# Patient Record
Sex: MALE | Race: WHITE | URBAN - METROPOLITAN AREA
[De-identification: names, ages, dates, MRNs, and addresses within clinical notes are randomized per-mention and may not be internally consistent; named-entity substitution may affect disease eponyms.]

---

## 2018-08-10 ENCOUNTER — OUTPATIENT (OUTPATIENT)
Dept: OUTPATIENT SERVICES | Facility: HOSPITAL | Age: 53
LOS: 1 days | Discharge: HOME | End: 2018-08-10

## 2021-08-02 ENCOUNTER — EMERGENCY (EMERGENCY)
Facility: HOSPITAL | Age: 56
LOS: 0 days | Discharge: HOME | End: 2021-08-03
Attending: EMERGENCY MEDICINE | Admitting: EMERGENCY MEDICINE
Payer: MEDICARE

## 2021-08-02 VITALS
SYSTOLIC BLOOD PRESSURE: 140 MMHG | HEART RATE: 104 BPM | DIASTOLIC BLOOD PRESSURE: 88 MMHG | OXYGEN SATURATION: 97 % | RESPIRATION RATE: 18 BRPM | TEMPERATURE: 99 F
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
## 2021-08-02 DIAGNOSIS — F31.9 BIPOLAR DISORDER, UNSPECIFIED: ICD-10-CM

## 2021-08-02 DIAGNOSIS — F10.929 ALCOHOL USE, UNSPECIFIED WITH INTOXICATION, UNSPECIFIED: ICD-10-CM

## 2021-08-02 DIAGNOSIS — R45.6 VIOLENT BEHAVIOR: ICD-10-CM

## 2021-08-02 DIAGNOSIS — F10.129 ALCOHOL ABUSE WITH INTOXICATION, UNSPECIFIED: ICD-10-CM

## 2021-08-02 DIAGNOSIS — Z79.899 OTHER LONG TERM (CURRENT) DRUG THERAPY: ICD-10-CM

## 2021-08-02 DIAGNOSIS — R00.0 TACHYCARDIA, UNSPECIFIED: ICD-10-CM

## 2021-08-02 LAB
ALBUMIN SERPL ELPH-MCNC: 5 G/DL — SIGNIFICANT CHANGE UP (ref 3.5–5.2)
ALP SERPL-CCNC: 103 U/L — SIGNIFICANT CHANGE UP (ref 30–115)
ALT FLD-CCNC: 110 U/L — HIGH (ref 0–41)
ANION GAP SERPL CALC-SCNC: 26 MMOL/L — HIGH (ref 7–14)
APAP SERPL-MCNC: <5 UG/ML — LOW (ref 10–30)
APPEARANCE UR: CLEAR — SIGNIFICANT CHANGE UP
AST SERPL-CCNC: 79 U/L — HIGH (ref 0–41)
BASOPHILS # BLD AUTO: 0.1 K/UL — SIGNIFICANT CHANGE UP (ref 0–0.2)
BASOPHILS NFR BLD AUTO: 1.4 % — HIGH (ref 0–1)
BILIRUB SERPL-MCNC: 0.4 MG/DL — SIGNIFICANT CHANGE UP (ref 0.2–1.2)
BILIRUB UR-MCNC: NEGATIVE — SIGNIFICANT CHANGE UP
BUN SERPL-MCNC: 15 MG/DL — SIGNIFICANT CHANGE UP (ref 10–20)
CALCIUM SERPL-MCNC: 9.5 MG/DL — SIGNIFICANT CHANGE UP (ref 8.5–10.1)
CHLORIDE SERPL-SCNC: 99 MMOL/L — SIGNIFICANT CHANGE UP (ref 98–110)
CO2 SERPL-SCNC: 16 MMOL/L — LOW (ref 17–32)
COLOR SPEC: YELLOW — SIGNIFICANT CHANGE UP
COMMENT - URINE: SIGNIFICANT CHANGE UP
CREAT SERPL-MCNC: 1.1 MG/DL — SIGNIFICANT CHANGE UP (ref 0.7–1.5)
DIFF PNL FLD: ABNORMAL
EOSINOPHIL # BLD AUTO: 0.14 K/UL — SIGNIFICANT CHANGE UP (ref 0–0.7)
EOSINOPHIL NFR BLD AUTO: 1.9 % — SIGNIFICANT CHANGE UP (ref 0–8)
EPI CELLS # UR: ABNORMAL /HPF
ETHANOL SERPL-MCNC: 298 MG/DL — HIGH
GLUCOSE SERPL-MCNC: 109 MG/DL — HIGH (ref 70–99)
GLUCOSE UR QL: NEGATIVE MG/DL — SIGNIFICANT CHANGE UP
HCT VFR BLD CALC: 51.6 % — SIGNIFICANT CHANGE UP (ref 42–52)
HGB BLD-MCNC: 17.7 G/DL — SIGNIFICANT CHANGE UP (ref 14–18)
IMM GRANULOCYTES NFR BLD AUTO: 0.4 % — HIGH (ref 0.1–0.3)
KETONES UR-MCNC: 15
LEUKOCYTE ESTERASE UR-ACNC: NEGATIVE — SIGNIFICANT CHANGE UP
LYMPHOCYTES # BLD AUTO: 2.75 K/UL — SIGNIFICANT CHANGE UP (ref 1.2–3.4)
LYMPHOCYTES # BLD AUTO: 37.7 % — SIGNIFICANT CHANGE UP (ref 20.5–51.1)
MCHC RBC-ENTMCNC: 31.4 PG — HIGH (ref 27–31)
MCHC RBC-ENTMCNC: 34.3 G/DL — SIGNIFICANT CHANGE UP (ref 32–37)
MCV RBC AUTO: 91.5 FL — SIGNIFICANT CHANGE UP (ref 80–94)
MONOCYTES # BLD AUTO: 0.76 K/UL — HIGH (ref 0.1–0.6)
MONOCYTES NFR BLD AUTO: 10.4 % — HIGH (ref 1.7–9.3)
NEUTROPHILS # BLD AUTO: 3.52 K/UL — SIGNIFICANT CHANGE UP (ref 1.4–6.5)
NEUTROPHILS NFR BLD AUTO: 48.2 % — SIGNIFICANT CHANGE UP (ref 42.2–75.2)
NITRITE UR-MCNC: NEGATIVE — SIGNIFICANT CHANGE UP
NRBC # BLD: 0 /100 WBCS — SIGNIFICANT CHANGE UP (ref 0–0)
PH UR: 5.5 — SIGNIFICANT CHANGE UP (ref 5–8)
PLATELET # BLD AUTO: 217 K/UL — SIGNIFICANT CHANGE UP (ref 130–400)
POTASSIUM SERPL-MCNC: 4.2 MMOL/L — SIGNIFICANT CHANGE UP (ref 3.5–5)
POTASSIUM SERPL-SCNC: 4.2 MMOL/L — SIGNIFICANT CHANGE UP (ref 3.5–5)
PROT SERPL-MCNC: 7.5 G/DL — SIGNIFICANT CHANGE UP (ref 6–8)
PROT UR-MCNC: 100 MG/DL
RBC # BLD: 5.64 M/UL — SIGNIFICANT CHANGE UP (ref 4.7–6.1)
RBC # FLD: 11.7 % — SIGNIFICANT CHANGE UP (ref 11.5–14.5)
RBC CASTS # UR COMP ASSIST: ABNORMAL /HPF
SALICYLATES SERPL-MCNC: <0.3 MG/DL — LOW (ref 4–30)
SODIUM SERPL-SCNC: 141 MMOL/L — SIGNIFICANT CHANGE UP (ref 135–146)
SP GR SPEC: >=1.03 (ref 1.01–1.03)
UROBILINOGEN FLD QL: 0.2 MG/DL — SIGNIFICANT CHANGE UP (ref 0.2–0.2)
WBC # BLD: 7.3 K/UL — SIGNIFICANT CHANGE UP (ref 4.8–10.8)
WBC # FLD AUTO: 7.3 K/UL — SIGNIFICANT CHANGE UP (ref 4.8–10.8)
WBC UR QL: SIGNIFICANT CHANGE UP /HPF

## 2021-08-02 PROCEDURE — 99284 EMERGENCY DEPT VISIT MOD MDM: CPT

## 2021-08-02 PROCEDURE — 93010 ELECTROCARDIOGRAM REPORT: CPT

## 2021-08-02 PROCEDURE — 90792 PSYCH DIAG EVAL W/MED SRVCS: CPT | Mod: 95

## 2021-08-02 RX ORDER — MIDAZOLAM HYDROCHLORIDE 1 MG/ML
5 INJECTION, SOLUTION INTRAMUSCULAR; INTRAVENOUS ONCE
Refills: 0 | Status: DISCONTINUED | OUTPATIENT
Start: 2021-08-02 | End: 2021-08-02

## 2021-08-02 RX ORDER — DIPHENHYDRAMINE HCL 50 MG
50 CAPSULE ORAL EVERY 6 HOURS
Refills: 0 | Status: DISCONTINUED | OUTPATIENT
Start: 2021-08-02 | End: 2021-08-03

## 2021-08-02 RX ADMIN — Medication 50 MILLIGRAM(S): at 13:06

## 2021-08-02 RX ADMIN — Medication 1 MILLIGRAM(S): at 14:00

## 2021-08-02 RX ADMIN — Medication 50 MILLIGRAM(S): at 20:50

## 2021-08-02 RX ADMIN — Medication 2 MILLIGRAM(S): at 13:11

## 2021-08-02 RX ADMIN — Medication 2 MILLIGRAM(S): at 13:06
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
## 2021-08-02 NOTE — ED PROVIDER NOTE - NSFOLLOWUPINSTRUCTIONS_ED_ALL_ED_FT
ALCOHOL DEPENDENCE - General Information     Alcohol Dependence    WHAT YOU NEED TO KNOW:    What is alcohol dependence? Alcohol dependence is the need to drink alcohol often to function in your daily life. You often drink large amounts of alcohol. Alcohol dependence is also known as alcoholism or alcohol use disorder. Alcoholism is a disease that can affect almost every part of your body.    What behaviors are common with alcohol dependence?     You keep drinking alcohol even if you know it increases your risk for health problems. Health problems include liver problems, stomach ulcers, high blood pressure, and stroke.      You develop a tolerance for alcohol. Tolerance means the amount of alcohol you usually drink no longer causes the effects you desire. You may need to drink even more alcohol to get its previous effects.      You put extra effort and time into drinking alcohol. You may often go to events or activities that will include drinking. You may also spend much of your time drinking alcohol or being with people who also drink.      You have withdrawal (physical or mental) symptoms after not drinking for a short period. The same amount of alcohol may be needed to relieve or prevent withdrawal symptoms. You may also have to drink to stop tremors (shakes) or to cure a hangover.      You crave alcohol. You may have a desire to drink more frequently and to drink larger amounts of alcohol.      You have problems decreasing or controlling alcohol use. You are not able to control your drinking habits. You keep going back to drinking even after you quit.      You spend less time doing more important things. You have trouble with social or daily activities at school, work, or home.    What increases my risk for alcohol dependence?     Family history      Depression or anxiety      Other substance abuse      Childhood trauma      Posttraumatic stress disorder      Other disorders, such as antisocial personality disorder and bipolar disorder    How is alcohol dependence treated? Your healthcare provider may admit you to the hospital to make sure you withdraw safely. Then you may need any of the following:    Medicines to decrease your craving for alcohol      Support groups such as Alcoholics Anonymous       Psychiatrist or psychologist for therapy       Admission to an inpatient facility for treatment for severe dependence    Where can I get more information about alcohol dependence?       [facility]  [address] [place], [place] [id number]  Phone: 1-126.714.1912  Phone: 1-695.552.7272  Web Address: http://www.ncadd.org            Alcoholics Anonymous  Web Address: http://www.aa.org      CARE AGREEMENT:    You have the right to help plan your care. Learn about your health condition and how it may be treated. Discuss treatment options with your healthcare providers to decide what care you want to receive. You always have the right to refuse treatment        Log Out.    [employer]® CareNotes®     :  [facility]             DEPRESSION - General Information     Depression    WHAT YOU NEED TO KNOW:    What is depression? Depression is a medical condition that causes feelings of sadness or hopelessness that do not go away. Depression may cause you to lose interest in things you used to enjoy. These feelings may interfere with your daily life.    What causes or increases my risk for depression? Depression may be caused by changes in brain chemicals that affect your mood. Your risk for depression may be higher if you have any of the following:    Stressful events such as the death of a loved one, unemployment, or divorce      A family history of depression      A chronic medical condition, such as diabetes, heart disease, or cancer      Drug or alcohol abuse    What are the signs and symptoms of depression?     Appetite changes, or weight gain or loss      Trouble going to sleep or staying asleep, or sleeping too much      Fatigue or lack of energy      Feeling restless, irritable, or withdrawn      Feeling worthless, hopeless, discouraged, or guilty      Trouble concentrating, remembering things, doing daily tasks, or making decisions      Thoughts about hurting or killing yourself    How is depression diagnosed? Your healthcare provider will ask about your symptoms and how long you have had them. He or she will ask if you have any family members with depression. Tell your healthcare provider about any stressful events in your life. He or she may ask about any other health conditions or medicines you take.     How is depression treated?     Therapy is often used together with medicine. Therapy is a way for you to talk about your feelings and anything that may be causing depression. Therapy can be done alone or in a group. It may also be done with family members or a significant other.      Antidepressant medicine may be given to relieve depression. You may need to take this medicine for several weeks before you begin to feel better. Tell your healthcare provider about any side effects or problems you have with your medicine. The type or amount of medicine may need to be changed.    How can I manage depression?     Get regular physical activity. Try to be active for 30 minutes, 3 to 5 days a week. Physical activity can help relieve depression. Work with your healthcare provider to develop a plan that you enjoy. It may help to ask someone to be active with you.      Create a regular sleep schedule. A routine can help you relax before bed. Listen to music, read, or do yoga. Try to go to bed and wake up at the same time every day. Sleep is important for emotional health.      Eat a variety of healthy foods. Healthy foods include fruits, vegetables, whole-grain breads, low-fat dairy products, lean meats, fish, and cooked beans. A healthy meal plan is low in fat, salt, and added sugar.      Do not drink alcohol or use drugs. Alcohol and drugs can make depression worse. Talk to your therapist or doctor if you need help quitting.    The following resources are available at any time to help you, if needed:     National Suicide Prevention Lifeline: 1-621.876.9973 (8-922-294-TALK)      Suicide Hotline: 1-908.141.7833 (7-492-LJKCPKA)      For a list of international numbers: https://save.org/find-help/international-resources/    Call your local emergency number (911 in the ) if:     You think about harming yourself or someone else.      You have done something on purpose to hurt yourself.    When should I call my therapist or doctor?     Your symptoms do not improve.      You cannot make it to your next appointment.       You have new symptoms.    You have questions or concerns about your condition or care    CARE AGREEMENT:  You have the right to help plan your care. Learn about your health condition and how it may be treated. Discuss treatment options with your healthcare providers to decide what care you want to receive. You always have the right to refuse treatmen

## 2021-08-02 NOTE — ED PROVIDER NOTE - PHYSICAL EXAMINATION
--EXAM--  VITAL SIGNS: I have reviewed vs documented at present.  CONSTITUTIONAL: intoxicated       CARD: S1, S2, Regular rate and rhythm.   RESP: No wheezes, rales or rhonchi.    PSYCH: yelling and agitated.
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
## 2021-08-02 NOTE — ED ADULT NURSE NOTE - PAIN RATING/NUMBER SCALE (0-10): REST
0 Render Post-Care Instructions In Note?: yes Render In Bullet Format When Appropriate: No Consent: The patient's consent was obtained including but not limited to risks of crusting, scabbing, blistering, scarring, darker or lighter pigmentary change, recurrence, incomplete removal and infection. Detail Level: Zone Total Number Of Aks Treated: 22 Post-Care Instructions: I reviewed with the patient in detail post-care instructions. Patient is to wear sunprotection, and avoid picking at any of the treated lesions. Pt may apply Vaseline to crusted or scabbing areas. Number Of Freeze-Thaw Cycles: 1 freeze-thaw cycle Duration Of Freeze Thaw-Cycle (Seconds): 20
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
## 2021-08-02 NOTE — ED PROVIDER NOTE - CLINICAL SUMMARY MEDICAL DECISION MAKING FREE TEXT BOX
56yM presents  intoxicated and with SI  -  labs reviewed  pt observed  until ETOH <100  pysch consulted 56yM presents  intoxicated and with SI  -  labs reviewed  pt observed  until ETOH <100  pysch consulted   cleared by psych  Pt not driving.  safety information   given to patient on discharge

## 2021-08-02 NOTE — ED PROVIDER NOTE - PATIENT PORTAL LINK FT
You can access the FollowMyHealth Patient Portal offered by [facility] by registering at the following website: [email or web address]. By joining [employer]’s FollowMyHealth portal, you will also be able to view your health information using other applications (apps) compatible with our system.

## 2021-08-02 NOTE — ED ADULT NURSE NOTE - CAS ELECT INFOMATION PROVIDED
pt awake alert and oriented.  waiting for belongings from security.  states he will walk home, lives 2 blocks away.  ambulates w/o assist./DC instructions

## 2021-08-03 VITALS
TEMPERATURE: 98 F | OXYGEN SATURATION: 98 % | RESPIRATION RATE: 17 BRPM | HEART RATE: 89 BPM | SYSTOLIC BLOOD PRESSURE: 142 MMHG | DIASTOLIC BLOOD PRESSURE: 89 MMHG

## 2021-08-03 DIAGNOSIS — F31.9 BIPOLAR DISORDER, UNSPECIFIED: ICD-10-CM
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
## 2021-08-03 NOTE — ED BEHAVIORAL HEALTH ASSESSMENT NOTE - RISK ASSESSMENT
Low Acute Suicide Risk COVID Exposure Screen- Patient  1.	*Have you had a COVID-19 test in the last 90 days?  (  ) Yes   (  ) No   ( x ) Unknown- Reason: _____  IF YES PROCEED TO QUESTION #2. IF NO OR UNKNOWN, PLEASE SKIP TO QUESTION #3.  2.	Date of test(s) and result(s): ________  3.	*Have you tested positive for COVID-19 antibodies? (  ) Yes   (  ) No   (x  ) Unknown- Reason: _____  IF YES PROCEED TO QUESTION #4. IF NO or UNKNOWN, PLEASE SKIP TO QUESTION #5.  4.	Date of positive antibody test: ________  5.	*Have you received 2 doses of the COVID-19 vaccine? ( x ) Yes   ( ) No   (  ) Unknown- Reason: _____   IF YES PROCEED TO QUESTION #6. IF NO or UNKNOWN, PLEASE SKIP TO QUESTION #7.  6.	Date of second dose: ________  7.	*In the past 10 days, have you been around anyone with a positive COVID-19 test?* (  ) Yes   (  ) No   ( x ) Unknown- Reason: ____  IF YES PROCEED TO QUESTION #8. IF NO or UNKNOWN, PLEASE SKIP TO QUESTION #13.  8.	Were you within 6 feet of them for at least 15 minutes? (  ) Yes   (  ) No   (  ) Unknown- Reason: _____  9.	Have you provided care for them? (  ) Yes   (  ) No   (  ) Unknown- Reason: ______  10.	Have you had direct physical contact with them (touched, hugged, or kissed them)? (  ) Yes   (  ) No    (  ) Unknown- Reason: _____  11.	Have you shared eating or drinking utensils with them? (  ) Yes   (  ) No    (  ) Unknown- Reason: ____  12.	Have they sneezed, coughed, or somehow gotten respiratory droplets on you? (  ) Yes   (  ) No    (  ) Unknown- Reason: ______  13.	*Have you been out of [place] within the past 10 days?* (  ) Yes   (  ) No   (  x) Unknown- Reason: _____  IF YES PLEASE ANSWER THE FOLLOWING QUESTIONS:  14.	Which state/country have you been to? ______  15.	Were you there over 24 hours? (  ) Yes   (  ) No    (  ) Unknown- Reason: ______  16.	Date of return to [facility]: ______ rf - alcohol abuse, mood disorder, recent stressors  pf - future oriented, help seeking, no prior SAs, no past hospitalizations, current outpatient care, identifies reasons for living, responsibility to others, healthy    COVID Exposure Screen- Patient  1.	*Have you had a COVID-19 test in the last 90 days?  (  ) Yes   (  ) No   ( x ) Unknown- Reason: _____  IF YES PROCEED TO QUESTION #2. IF NO OR UNKNOWN, PLEASE SKIP TO QUESTION #3.  2.	Date of test(s) and result(s): ________  3.	*Have you tested positive for COVID-19 antibodies? (  ) Yes   (  ) No   (x  ) Unknown- Reason: _____  IF YES PROCEED TO QUESTION #4. IF NO or UNKNOWN, PLEASE SKIP TO QUESTION #5.  4.	Date of positive antibody test: ________  5.	*Have you received 2 doses of the COVID-19 vaccine? ( x ) Yes   ( ) No   (  ) Unknown- Reason: _____   IF YES PROCEED TO QUESTION #6. IF NO or UNKNOWN, PLEASE SKIP TO QUESTION #7.  6.	Date of second dose: ________  7.	*In the past 10 days, have you been around anyone with a positive COVID-19 test?* (  ) Yes   (  ) No   ( x ) Unknown- Reason: ____  IF YES PROCEED TO QUESTION #8. IF NO or UNKNOWN, PLEASE SKIP TO QUESTION #13.  8.	Were you within 6 feet of them for at least 15 minutes? (  ) Yes   (  ) No   (  ) Unknown- Reason: _____  9.	Have you provided care for them? (  ) Yes   (  ) No   (  ) Unknown- Reason: ______  10.	Have you had direct physical contact with them (touched, hugged, or kissed them)? (  ) Yes   (  ) No    (  ) Unknown- Reason: _____  11.	Have you shared eating or drinking utensils with them? (  ) Yes   (  ) No    (  ) Unknown- Reason: ____  12.	Have they sneezed, coughed, or somehow gotten respiratory droplets on you? (  ) Yes   (  ) No    (  ) Unknown- Reason: ______  13.	*Have you been out of [place] within the past 10 days?* (  ) Yes   (  ) No   (  x) Unknown- Reason: _____  IF YES PLEASE ANSWER THE FOLLOWING QUESTIONS:  14.	Which state/country have you been to? ______  15.	Were you there over 24 hours? (  ) Yes   (  ) No    (  ) Unknown- Reason: ______  16.	Date of return to [facility]: ______

## 2021-08-03 NOTE — ED BEHAVIORAL HEALTH ASSESSMENT NOTE - OTHER PAST PSYCHIATRIC HISTORY (INCLUDE DETAILS REGARDING ONSET, COURSE OF ILLNESS, INPATIENT/OUTPATIENT TREATMENT)
reports dx of bipolar as a child, no prior admissions, sees therapist Sarah and psychiatrist Sylvia at [facility] on [facility]

## 2021-08-03 NOTE — ED BEHAVIORAL HEALTH ASSESSMENT NOTE - DETAILS
hpi safety plan completed and copy given to patient; patient advised to return to ED or call 911 if symptoms worsen or thoughts to harm self or others occur. father self

## 2021-08-03 NOTE — ED BEHAVIORAL HEALTH ASSESSMENT NOTE - SUMMARY
Patient is a 57yo male, , domiciled with girlfriend, has 2 adult and 2 young children (5, 3) in custody of mother, part time employed as heating/AC technician, with self reported pph of bipolar disorder, no prior psych admissions, current outpatient at [facility], no past reported SAs or NSSIB, no known hx of violence, active alcohol abuse, and no known pmh. He is BIBEMS from home for SI after contacting suicide hotline while intoxicated;  on arrival.     Patient was drinking more than usual tonight and stressed after some dental and medical issues over the weekend. He usually leans on his live in girlfriend and close by family for support but they were all unavailable tonight so he spoke to a suicide hotline. He has had recent stressors but otherwise denies significant changes in his mood. He has outpatient care in place, is future oriented and help seeking, and was able to safety plan appropriately. He is not interested in voluntary admission and does not meet criteria for involuntary hospitalization; cleared for discharge

## 2021-08-03 NOTE — ED BEHAVIORAL HEALTH ASSESSMENT NOTE - SAFETY PLAN ADDT'L DETAILS
Safety plan discussed with.../Education provided regarding environmental safety / lethal means restriction/Provision of National Suicide Prevention Lifeline 1-265-813-RKQU (6880)

## 2021-08-03 NOTE — ED BEHAVIORAL HEALTH ASSESSMENT NOTE - REFERRAL / APPOINTMENT DETAILS
Patient will follow up with outpatient psychiatrist and therapist by phone in the morning with upcoming appts

## 2021-08-03 NOTE — ED BEHAVIORAL HEALTH ASSESSMENT NOTE - HPI (INCLUDE ILLNESS QUALITY, SEVERITY, DURATION, TIMING, CONTEXT, MODIFYING FACTORS, ASSOCIATED SIGNS AND SYMPTOMS)
Patient is a Patient is a 57yo male, , domiciled with girlfriend, has 2 adult and 2 young children (5, 3) in custody of mother, part time employed as heating/AC technician, with self reported pph of bipolar disorder, no prior psych admissions, current outpatient at [facility], no past reported SAs or NSSIB, no known hx of violence, active alcohol abuse, and no known pmh. He is BIBEMS from home for SI after contacting suicide hotline while intoxicated; BAL     Patient reports he was upset because the dentist had pulled all his front teeth out on Saturday and so drank more than usual/ He admits to drinking 5 to 6 "shotties" or lil pints of hard liquor. He initially denies events leading up to presentation, but later confirms contacting suicide hotline after prompting. He denies any past or current passive or active SI/P/I or SAs, he reports sadness from not being able to see his children who live with their mother despite paying child support. This has been going on for  years. He also notes his parents passed recently. Patient denies any depressive symptoms and minimizes mood symptoms. He reports good sleep, appetite, energy, and interest. Denies s/sx of psychosis or yaneth including elevated mood, increased energy, increased goal directed activities, decreased need for sleep, talkativeness, racing thoughts, increased distractibility, grandiosity, impulsivity and risky behavior, AVH, paranoia, delusions. He reports drinking approx 5 days a week, about 1 drink on avg. He describes compliance to his outpatient providers and meds. Patient reports he lives on the same block as multiple family members including cousins, brother that he usually turns to for support but were unavailable today. He states he had called the suicide hotline just looking for someone to talk to as a friend and did not meant to concern anyone. He is insistent on discharge home and asserts he is able to keep himself safe.

## 2021-08-03 NOTE — ED BEHAVIORAL HEALTH ASSESSMENT NOTE - DESCRIPTION
see bh note    Vital Signs Last 24 Hrs  T(C): 36.7 (03 Aug 2021 00:23), Max: 37.4 (02 Aug 2021 12:33)  T(F): 98.1 (03 Aug 2021 00:23), Max: 99.3 (02 Aug 2021 12:33)  HR: 89 (03 Aug 2021 00:23) (89 - 107)  BP: 142/89 (03 Aug 2021 00:23) (139/91 - 142/89)  BP(mean): --  RR: 17 (03 Aug 2021 00:23) (17 - 18)  SpO2: 98% (03 Aug 2021 00:23) (97% - 98%) see hpi denies

## 2022-04-23 ENCOUNTER — EMERGENCY (EMERGENCY)
Facility: HOSPITAL | Age: 57
LOS: 0 days | Discharge: HOME | End: 2022-04-23
Attending: EMERGENCY MEDICINE | Admitting: EMERGENCY MEDICINE
Payer: MEDICARE

## 2022-04-23 VITALS
HEART RATE: 74 BPM | RESPIRATION RATE: 18 BRPM | SYSTOLIC BLOOD PRESSURE: 172 MMHG | DIASTOLIC BLOOD PRESSURE: 98 MMHG | TEMPERATURE: 98 F | OXYGEN SATURATION: 98 %

## 2022-04-23 DIAGNOSIS — M79.642 PAIN IN LEFT HAND: ICD-10-CM

## 2022-04-23 DIAGNOSIS — F17.200 NICOTINE DEPENDENCE, UNSPECIFIED, UNCOMPLICATED: ICD-10-CM

## 2022-04-23 DIAGNOSIS — M79.89 OTHER SPECIFIED SOFT TISSUE DISORDERS: ICD-10-CM

## 2022-04-23 PROCEDURE — 99283 EMERGENCY DEPT VISIT LOW MDM: CPT | Mod: 25

## 2022-04-23 PROCEDURE — 73130 X-RAY EXAM OF HAND: CPT | Mod: 26,LT

## 2022-04-23 PROCEDURE — 29125 APPL SHORT ARM SPLINT STATIC: CPT

## 2022-04-23 RX ORDER — IBUPROFEN 200 MG
600 TABLET ORAL ONCE
Refills: 0 | Status: COMPLETED | OUTPATIENT
Start: 2022-04-23 | End: 2022-04-23

## 2022-04-23 RX ADMIN — Medication 600 MILLIGRAM(S): at 11:23

## 2022-04-23 RX ADMIN — Medication 600 MILLIGRAM(S): at 10:53

## 2022-04-23 NOTE — ED PROVIDER NOTE - PHYSICAL EXAMINATION
VITAL SIGNS: I have reviewed nursing notes and confirm.  CONSTITUTIONAL: Well-developed; well-nourished; in no acute distress.  SKIN: Skin exam is warm and dry, no acute rash.  HEAD: Normocephalic; atraumatic.  EYES: PERRL, EOM intact; conjunctiva and sclera clear.  ENT: No nasal discharge; airway clear.   EXT: Normal ROM. +Minimal swelling to left hand. +Tenderness to first digit with painful ROM however full passive ROM. +Left snuffbox tenderness. No erythema.   LYMPH: No acute cervical adenopathy.  NEURO: Alert, oriented. Grossly unremarkable. No focal deficits.  PSYCH: Cooperative, appropriate.

## 2022-04-23 NOTE — ED PROVIDER NOTE - PATIENT PORTAL LINK FT
You can access the FollowMyHealth Patient Portal offered by [facility] by registering at the following website: [email or web address]. By joining [facility]’s FollowMyHealth portal, you will also be able to view your health information using other applications (apps) compatible with our system.

## 2022-04-23 NOTE — ED PROVIDER NOTE - OBJECTIVE STATEMENT
56 yo M presenting with left hand pain and swelling since this morning. Denies any trauma but does reports sleeping on his hand. No paresthesias, weakness, bruising, or bleeding. Pain is worse with movement on left thumb.

## 2022-04-23 NOTE — ED PROVIDER NOTE - CARE PROVIDER_API CALL
Jason Almonte)  Orthopaedic Surgery  [address] [place] [address]  Phone: [phone]  Fax: [phone]  Follow Up Time: 1-3 Days

## 2022-04-23 NOTE — ED PROVIDER NOTE - NS ED ROS FT
Review of Systems:  	•	CONSTITUTIONAL - no fever, no diaphoresis, no chills  	•	SKIN - no rash  	•	HEMATOLOGIC - no bleeding, no bruising  	•	EYES - no eye pain, no blurry vision  	•	ENT - no congestion  	•	RESPIRATORY - no shortness of breath, no cough  	•	CARDIAC - no chest pain, no palpitations  	•	MUSCULOSKELETAL - +hand pain and swelling, no redness  	•	NEUROLOGIC - no weakness, no headache, no paresthesias  	All other ROS are negative except as documented in HPI.

## 2022-04-23 NOTE — ED PROVIDER NOTE - NS ED ATTENDING STATEMENT MOD
This was a shared visit with the TICO. I reviewed and verified the documentation and independently performed the documented:

## 2022-04-23 NOTE — ED PROVIDER NOTE - CLINICAL SUMMARY MEDICAL DECISION MAKING FREE TEXT BOX
Patient presented with atraumatic left hand pain and swelling since this morning, although patient states that he slept on his hand last night which may have led to the pain.  Otherwise on arrival patient afebrile, hemodynamically stable, neurovascularly intact, no signs of infection on exam.  Obtain x-ray which was negative for acute fracture dislocation, and pain otherwise controlled with p.o. NSAID in ED.  Patient able to use hand without difficulty.  Given the above, will discharge home with outpatient follow up. Patient agreeable with plan. Agrees to return to ED for any new or worsening symptoms.

## 2023-04-14 ENCOUNTER — NON-APPOINTMENT (OUTPATIENT)
Age: 58
End: 2023-04-14

## 2023-04-15 ENCOUNTER — INPATIENT (INPATIENT)
Facility: HOSPITAL | Age: 58
LOS: 1 days | Discharge: HOME | DRG: 144 | End: 2023-04-17
Attending: INTERNAL MEDICINE | Admitting: INTERNAL MEDICINE
Payer: MEDICARE

## 2023-04-15 VITALS
OXYGEN SATURATION: 98 % | TEMPERATURE: 99 F | WEIGHT: 158.95 LBS | DIASTOLIC BLOOD PRESSURE: 86 MMHG | HEIGHT: 68 IN | SYSTOLIC BLOOD PRESSURE: 130 MMHG | HEART RATE: 89 BPM

## 2023-04-15 DIAGNOSIS — J03.90 ACUTE TONSILLITIS, UNSPECIFIED: ICD-10-CM

## 2023-04-15 DIAGNOSIS — I10 ESSENTIAL (PRIMARY) HYPERTENSION: ICD-10-CM

## 2023-04-15 DIAGNOSIS — F17.210 NICOTINE DEPENDENCE, CIGARETTES, UNCOMPLICATED: ICD-10-CM

## 2023-04-15 DIAGNOSIS — K80.20 CALCULUS OF GALLBLADDER WITHOUT CHOLECYSTITIS WITHOUT OBSTRUCTION: ICD-10-CM

## 2023-04-15 DIAGNOSIS — Z20.822 CONTACT WITH AND (SUSPECTED) EXPOSURE TO COVID-19: ICD-10-CM

## 2023-04-15 DIAGNOSIS — K12.2 CELLULITIS AND ABSCESS OF MOUTH: ICD-10-CM

## 2023-04-15 DIAGNOSIS — J36 PERITONSILLAR ABSCESS: ICD-10-CM

## 2023-04-15 DIAGNOSIS — R59.0 LOCALIZED ENLARGED LYMPH NODES: ICD-10-CM

## 2023-04-15 LAB
ALBUMIN SERPL ELPH-MCNC: 4.7 G/DL — SIGNIFICANT CHANGE UP (ref 3.5–5.2)
ALP SERPL-CCNC: 108 U/L — SIGNIFICANT CHANGE UP (ref 30–115)
ALT FLD-CCNC: 43 U/L — HIGH (ref 0–41)
ANION GAP SERPL CALC-SCNC: 14 MMOL/L — SIGNIFICANT CHANGE UP (ref 7–14)
AST SERPL-CCNC: 21 U/L — SIGNIFICANT CHANGE UP (ref 0–41)
BASOPHILS # BLD AUTO: 0.06 K/UL — SIGNIFICANT CHANGE UP (ref 0–0.2)
BASOPHILS NFR BLD AUTO: 0.6 % — SIGNIFICANT CHANGE UP (ref 0–1)
BILIRUB SERPL-MCNC: 1 MG/DL — SIGNIFICANT CHANGE UP (ref 0.2–1.2)
BUN SERPL-MCNC: 14 MG/DL — SIGNIFICANT CHANGE UP (ref 10–20)
CALCIUM SERPL-MCNC: 9.8 MG/DL — SIGNIFICANT CHANGE UP (ref 8.4–10.5)
CHLORIDE SERPL-SCNC: 95 MMOL/L — LOW (ref 98–110)
CO2 SERPL-SCNC: 29 MMOL/L — SIGNIFICANT CHANGE UP (ref 17–32)
CREAT SERPL-MCNC: 1 MG/DL — SIGNIFICANT CHANGE UP (ref 0.7–1.5)
EGFR: 88 ML/MIN/1.73M2 — SIGNIFICANT CHANGE UP
EOSINOPHIL # BLD AUTO: 0.26 K/UL — SIGNIFICANT CHANGE UP (ref 0–0.7)
EOSINOPHIL NFR BLD AUTO: 2.7 % — SIGNIFICANT CHANGE UP (ref 0–8)
GLUCOSE SERPL-MCNC: 104 MG/DL — HIGH (ref 70–99)
HCT VFR BLD CALC: 45.7 % — SIGNIFICANT CHANGE UP (ref 42–52)
HGB BLD-MCNC: 16.1 G/DL — SIGNIFICANT CHANGE UP (ref 14–18)
IMM GRANULOCYTES NFR BLD AUTO: 0.9 % — HIGH (ref 0.1–0.3)
LACTATE SERPL-SCNC: 1 MMOL/L — SIGNIFICANT CHANGE UP (ref 0.7–2)
LYMPHOCYTES # BLD AUTO: 1.53 K/UL — SIGNIFICANT CHANGE UP (ref 1.2–3.4)
LYMPHOCYTES # BLD AUTO: 16 % — LOW (ref 20.5–51.1)
MCHC RBC-ENTMCNC: 31 PG — SIGNIFICANT CHANGE UP (ref 27–31)
MCHC RBC-ENTMCNC: 35.2 G/DL — SIGNIFICANT CHANGE UP (ref 32–37)
MCV RBC AUTO: 88.1 FL — SIGNIFICANT CHANGE UP (ref 80–94)
MONOCYTES # BLD AUTO: 1.04 K/UL — HIGH (ref 0.1–0.6)
MONOCYTES NFR BLD AUTO: 10.9 % — HIGH (ref 1.7–9.3)
NEUTROPHILS # BLD AUTO: 6.6 K/UL — HIGH (ref 1.4–6.5)
NEUTROPHILS NFR BLD AUTO: 68.9 % — SIGNIFICANT CHANGE UP (ref 42.2–75.2)
NRBC # BLD: 0 /100 WBCS — SIGNIFICANT CHANGE UP (ref 0–0)
PLATELET # BLD AUTO: 307 K/UL — SIGNIFICANT CHANGE UP (ref 130–400)
PMV BLD: 10.6 FL — HIGH (ref 7.4–10.4)
POTASSIUM SERPL-MCNC: 4.1 MMOL/L — SIGNIFICANT CHANGE UP (ref 3.5–5)
POTASSIUM SERPL-SCNC: 4.1 MMOL/L — SIGNIFICANT CHANGE UP (ref 3.5–5)
PROT SERPL-MCNC: 8.1 G/DL — HIGH (ref 6–8)
RBC # BLD: 5.19 M/UL — SIGNIFICANT CHANGE UP (ref 4.7–6.1)
RBC # FLD: 11.4 % — LOW (ref 11.5–14.5)
SARS-COV-2 RNA SPEC QL NAA+PROBE: SIGNIFICANT CHANGE UP
SODIUM SERPL-SCNC: 138 MMOL/L — SIGNIFICANT CHANGE UP (ref 135–146)
WBC # BLD: 9.58 K/UL — SIGNIFICANT CHANGE UP (ref 4.8–10.8)
WBC # FLD AUTO: 9.58 K/UL — SIGNIFICANT CHANGE UP (ref 4.8–10.8)

## 2023-04-15 PROCEDURE — 87075 CULTR BACTERIA EXCEPT BLOOD: CPT

## 2023-04-15 PROCEDURE — 99291 CRITICAL CARE FIRST HOUR: CPT

## 2023-04-15 PROCEDURE — 87205 SMEAR GRAM STAIN: CPT

## 2023-04-15 PROCEDURE — 87070 CULTURE OTHR SPECIMN AEROBIC: CPT

## 2023-04-15 PROCEDURE — 31575 DIAGNOSTIC LARYNGOSCOPY: CPT

## 2023-04-15 PROCEDURE — 99223 1ST HOSP IP/OBS HIGH 75: CPT

## 2023-04-15 PROCEDURE — 87077 CULTURE AEROBIC IDENTIFY: CPT

## 2023-04-15 PROCEDURE — 70491 CT SOFT TISSUE NECK W/DYE: CPT | Mod: 26,MA

## 2023-04-15 RX ORDER — ACETAMINOPHEN 500 MG
650 TABLET ORAL EVERY 6 HOURS
Refills: 0 | Status: DISCONTINUED | OUTPATIENT
Start: 2023-04-15 | End: 2023-04-17

## 2023-04-15 RX ORDER — AMPICILLIN SODIUM AND SULBACTAM SODIUM 250; 125 MG/ML; MG/ML
3 INJECTION, POWDER, FOR SUSPENSION INTRAMUSCULAR; INTRAVENOUS EVERY 6 HOURS
Refills: 0 | Status: DISCONTINUED | OUTPATIENT
Start: 2023-04-15 | End: 2023-04-17

## 2023-04-15 RX ORDER — DEXAMETHASONE 0.5 MG/5ML
10 ELIXIR ORAL ONCE
Refills: 0 | Status: COMPLETED | OUTPATIENT
Start: 2023-04-15 | End: 2023-04-15

## 2023-04-15 RX ORDER — BENZOCAINE 10 %
1 GEL (GRAM) MUCOUS MEMBRANE ONCE
Refills: 0 | Status: COMPLETED | OUTPATIENT
Start: 2023-04-15 | End: 2023-04-15

## 2023-04-15 RX ORDER — DEXAMETHASONE 0.5 MG/5ML
8 ELIXIR ORAL EVERY 8 HOURS
Refills: 0 | Status: DISCONTINUED | OUTPATIENT
Start: 2023-04-15 | End: 2023-04-15

## 2023-04-15 RX ORDER — SODIUM CHLORIDE 9 MG/ML
1000 INJECTION, SOLUTION INTRAVENOUS
Refills: 0 | Status: DISCONTINUED | OUTPATIENT
Start: 2023-04-15 | End: 2023-04-17

## 2023-04-15 RX ORDER — KETOROLAC TROMETHAMINE 30 MG/ML
15 SYRINGE (ML) INJECTION ONCE
Refills: 0 | Status: DISCONTINUED | OUTPATIENT
Start: 2023-04-15 | End: 2023-04-15

## 2023-04-15 RX ORDER — SODIUM CHLORIDE 9 MG/ML
1000 INJECTION INTRAMUSCULAR; INTRAVENOUS; SUBCUTANEOUS ONCE
Refills: 0 | Status: COMPLETED | OUTPATIENT
Start: 2023-04-15 | End: 2023-04-15

## 2023-04-15 RX ORDER — DEXAMETHASONE 0.5 MG/5ML
8 ELIXIR ORAL EVERY 8 HOURS
Refills: 0 | Status: DISCONTINUED | OUTPATIENT
Start: 2023-04-15 | End: 2023-04-16

## 2023-04-15 RX ORDER — KETOROLAC TROMETHAMINE 30 MG/ML
15 SYRINGE (ML) INJECTION THREE TIMES A DAY
Refills: 0 | Status: DISCONTINUED | OUTPATIENT
Start: 2023-04-15 | End: 2023-04-16

## 2023-04-15 RX ORDER — FUROSEMIDE 40 MG
40 TABLET ORAL DAILY
Refills: 0 | Status: DISCONTINUED | OUTPATIENT
Start: 2023-04-15 | End: 2023-04-17

## 2023-04-15 RX ORDER — AMPICILLIN SODIUM AND SULBACTAM SODIUM 250; 125 MG/ML; MG/ML
3 INJECTION, POWDER, FOR SUSPENSION INTRAMUSCULAR; INTRAVENOUS ONCE
Refills: 0 | Status: COMPLETED | OUTPATIENT
Start: 2023-04-15 | End: 2023-04-15

## 2023-04-15 RX ADMIN — AMPICILLIN SODIUM AND SULBACTAM SODIUM 200 GRAM(S): 250; 125 INJECTION, POWDER, FOR SUSPENSION INTRAMUSCULAR; INTRAVENOUS at 17:27

## 2023-04-15 RX ADMIN — Medication 10 MILLIGRAM(S): at 10:17

## 2023-04-15 RX ADMIN — AMPICILLIN SODIUM AND SULBACTAM SODIUM 200 GRAM(S): 250; 125 INJECTION, POWDER, FOR SUSPENSION INTRAMUSCULAR; INTRAVENOUS at 10:20

## 2023-04-15 RX ADMIN — Medication 101.6 MILLIGRAM(S): at 15:10

## 2023-04-15 RX ADMIN — SODIUM CHLORIDE 1000 MILLILITER(S): 9 INJECTION INTRAMUSCULAR; INTRAVENOUS; SUBCUTANEOUS at 10:16

## 2023-04-15 RX ADMIN — Medication 1 SPRAY(S): at 14:30

## 2023-04-15 RX ADMIN — Medication 15 MILLIGRAM(S): at 10:16

## 2023-04-15 NOTE — ED PROVIDER NOTE - PHYSICAL EXAMINATION
VITAL SIGNS: AFebrile, vital signs stable  CONSTITUTIONAL: Well-developed; well-nourished; in no acute distress.  SKIN: Skin exam is warm and dry, no acute rash.  HEAD: Normocephalic; atraumatic.  EYES: Pupils equal round reactive to light, Extraocular movements intact; conjunctiva and sclera clear.  ENT: No nasal discharge; airway clear. Moist mucus membranes. L sided peritonsillar swelling/possible abscess, unable to visualize uvula, no drooling, no swelling base of tongue, no neck swelling/redness  NECK: Supple; non tender. No rigidity  CARD: Regular rate and rhythm. Normal S1, S2; no murmurs, gallops, or rubs.  RESP: Lungs clear to auscultation bilaterally. No wheezes, rales or rhonchi.  ABD: Abdomen soft; non-tender; non-distended;  no hepatosplenomegaly. No costovertebral angle tenderness.   EXT: Normal ROM. No clubbing, cyanosis or edema. No calf tenderness to palpation.  NEURO: Alert and oriented x 3. No focal deficits.  PSYCH: Cooperative, appropriate. VITAL SIGNS: AFebrile, vital signs stable  CONSTITUTIONAL: Well-developed; well-nourished; in no acute distress.  SKIN: Skin exam is warm and dry, no acute rash.  HEAD: Normocephalic; atraumatic.  EYES: Pupils equal round reactive to light, Extraocular movements intact; conjunctiva and sclera clear.  ENT: No nasal discharge; airway clear. Moist mucus membranes. L sided peritonsillar swelling/possible abscess, unable to visualize uvula, no drooling, no swelling base of tongue, no neck swelling/redness, +trismus, +muffled speech  NECK: Supple; non tender. No rigidity  CARD: Regular rate and rhythm. Normal S1, S2; no murmurs, gallops, or rubs.  RESP: Lungs clear to auscultation bilaterally. No wheezes, rales or rhonchi.  ABD: Abdomen soft; non-tender; non-distended;  no hepatosplenomegaly. No costovertebral angle tenderness.   EXT: Normal ROM. No clubbing, cyanosis or edema. No calf tenderness to palpation.  NEURO: Alert and oriented x 3. No focal deficits.  PSYCH: Cooperative, appropriate.

## 2023-04-15 NOTE — ED PROVIDER NOTE - NS ED ATTENDING STATEMENT MOD
Attending Only I have personally provided the amount of critical care time documented below excluding time spent on separate procedures. Calcipotriene Counseling:  I discussed with the patient the risks of calcipotriene including but not limited to erythema, scaling, itching, and irritation.

## 2023-04-15 NOTE — H&P ADULT - HISTORY OF PRESENT ILLNESS
Pt is a 57yo Male with a PMH including HTN (not on any meds), cholelithiasis, alcohol use disorder (quit alcohol) who presents to the hospital with left sided sore throat x 4-5 days. Patient states that pain began suddenly and is located primarily on the left. Pt reports associated dysphagia and odynophagia initially to solids but then also to liquids. Also reports increasingly muffled voice and reports having difficulty speaking last night secondary to pain. States that he tried taking tylenol, ibuprofen, and robitussin without relief. Pt thought it was related to his teeth and saw his dentist, but denied any work being done at that time. States that pain acutely worsening overnight and that he went to an urgent care today, who then sent him to the hospital. Endorses chills and states that he felt SOB last night when laying down in bed, which improved when he sat up. Denies fever, chest pain, drooling or inability to tolerate secretions, other complaints.     In ED, VS within normal limits, labs unremarkable.   CT neck: Two 1 cm intratonsillar abscesses in the left palatine tonsil with probable partial decompression of the more caudal abscess. Additional 1.7 cm peritonsillar abscess in the superior aspect of the left palatine tonsil. Associated peritonsillar edema with thickening of the soft palate and uvula. Mild oropharyngeal airway narrowing. L cervical LAD  Received unasyn, dexa, fluids

## 2023-04-15 NOTE — ED PROVIDER NOTE - CLINICAL SUMMARY MEDICAL DECISION MAKING FREE TEXT BOX
case d/w ICU Fellow Dr Levine, states pt can be admitted to step down, will upgrade to icu if needs OR/inbutation. ENT informed. ENT ALEXIS Leon is coming back to scope pt in ED.

## 2023-04-15 NOTE — ED PROVIDER NOTE - PROGRESS NOTE DETAILS
ENT consult placed on TEAMS. will get labs, cultures, start IVF, toradol, decadron, IV abx while pending ENT recommendations. ENT consult placed on TEAMS. will get labs, cultures, start IVF, toradol, decadron, IV abx, CT soft tissue neck, while pending ENT evaluation/recommendations ENT ALEXIS Leon updated on CT read, states she will d/w attending discussed results w pt, states his swelling and speech improved hes feeling better, no resp distress. pt going to be moved to crit 3 for ent scope. crit attending aware. discussed results w pt, states his swelling, pain, diffuiculty swallowing and speech improved hes feeling better, no resp distress.

## 2023-04-15 NOTE — H&P ADULT - NSHPPHYSICALEXAM_GEN_ALL_CORE
PHYSICAL EXAM:  GENERAL: NAD  HEAD: Atraumatic, Normocephalic  EYES: conjunctiva and sclera clear  ENMT: Moist mucous membranes. BL tonsillar swelling   NECK: Supple, No JVD, Normal thyroid  HEART: Regular rate and rhythm; No murmurs, rubs, or gallops  RESPIRATORY: CTA B/L, No W/R/R  ABDOMEN: Soft, Nontender, Nondistended;   NEUROLOGY: A&Ox3, moving all extremities  EXTREMITIES: No clubbing, cyanosis, or edema

## 2023-04-15 NOTE — ED PROVIDER NOTE - OBJECTIVE STATEMENT
58-year-old male no past medical history presents with 4 to 5 days of sore throat subjective fever.  Difficulty swallowing the last 4 days.  Able to tolerate sips of water but unable to tolerate food.  No cough runny nose.  No chest pain shortness of breath.  Taking over-the-counter Robitussin and ibuprofen with no relief.  Ex-smoker quit 1 week ago.  Seen at urgent care today and was told has abscess and needs to come to the emergency department.

## 2023-04-15 NOTE — CONSULT NOTE ADULT - SUBJECTIVE AND OBJECTIVE BOX
Pt is a 57yo Male with a PMH including HTN, cholelithiasis who presents to the hospital with left sided sore throat x 4-5 days. Patient states that pain began suddenly and is located primarily on the left. Pt reports associated dysphagia and odynophagia initially to solids but then also to liquids. Also reports increasingly muffled voice and reports having difficulty speaking last night secondary to pain. States that he tried taking tylenol, ibuprofen, and robitussin without relief. Pt thought it was related to his teeth and saw his dentist, but denied any work being done at that time. States that pain acutely worsening overnight and that he went to an urgent care today, who then sent him to the hospital. Endorses chills and states that he felt SOB last night when laying down in bed, which improved when he sat up. Denies fever, chest pain, drooling or inability to tolerate secretions, other complaints.     PAST MEDICAL & SURGICAL HISTORY:  HTN  Cholelithiasis  No significant past surgical history    Allergies  No Known Allergies    SOCIAL HISTORY: Tobacco cessation approximately 1 week ago.    REVIEW OF SYSTEMS   [x] A ten-point review of systems was otherwise negative except as noted.    Vital Signs Last 24 Hrs  T(C): 37.4 (15 Apr 2023 09:21), Max: 37.4 (15 Apr 2023 09:21)  T(F): 99.4 (15 Apr 2023 09:21), Max: 99.4 (15 Apr 2023 09:21)  HR: 89 (15 Apr 2023 09:21) (89 - 89)  BP: 130/86 (15 Apr 2023 09:21) (130/86 - 130/86)  SpO2: 98% (15 Apr 2023 09:21) (98% - 98%)    Parameters below as of 15 Apr 2023 09:21  Patient On (Oxygen Delivery Method): room air    PHYSICAL EXAM:  GEN: Nontoxic appearing, no acute distress, awake and alert. No drooling or pooling of secretions. No stridor or stertor. Muffled vocal quality.   SKIN: Non diaphoretic.  HEENT: ++Trismus. Oral mucosa pink and moist. Exam very limited 2/2 trismus but left peritonsillar edema noted. Unable to visualized uvula or posterior oropharynx. No erythema or edema noted to buccal mucosa, tongue, FOM.  NECK: Trachea midline, neck supple. Left neck tenderness to palpation.  RESP: No dyspnea, non-labored breathing. No use of accessory muscles.   CARDIO: +S1/S2  ABDO: Soft, NT.  EXT: CEBALLOS x 4    LABS:             16.1   9.58  )-----------( 307      ( 15 Apr 2023 10:50 )             45.7     04-15  138  |  95<L>  |  14  ----------------------------<  104<H>  4.1   |  29  |  1.0    Ca    9.8      15 Apr 2023 10:50    TPro  8.1<H>  /  Alb  4.7  /  TBili  1.0  /  DBili  x   /  AST  21  /  ALT  43<H>  /  AlkPhos  108  04-15    RADIOLOGY & ADDITIONAL STUDIES:  Pending CT neck read -- reviewed w/ Dr. John, concern to L PTA & tonsillar abscess, uvulitis Pt is a 57yo Male with a PMH including HTN, cholelithiasis who presents to the hospital with left sided sore throat x 4-5 days. Patient states that pain began suddenly and is located primarily on the left. Pt reports associated dysphagia and odynophagia initially to solids but then also to liquids. Also reports increasingly muffled voice and reports having difficulty speaking last night secondary to pain. States that he tried taking tylenol, ibuprofen, and robitussin without relief. Pt thought it was related to his teeth and saw his dentist, but denied any work being done at that time. States that pain acutely worsening overnight and that he went to an urgent care today, who then sent him to the hospital. Endorses chills and states that he felt SOB last night when laying down in bed, which improved when he sat up. Denies fever, chest pain, drooling or inability to tolerate secretions, other complaints.     PAST MEDICAL & SURGICAL HISTORY:  HTN  Cholelithiasis  No significant past surgical history    Allergies  No Known Allergies    SOCIAL HISTORY: Tobacco cessation approximately 1 week ago.    REVIEW OF SYSTEMS   [x] A ten-point review of systems was otherwise negative except as noted.    Vital Signs Last 24 Hrs  T(C): 37.4 (15 Apr 2023 09:21), Max: 37.4 (15 Apr 2023 09:21)  T(F): 99.4 (15 Apr 2023 09:21), Max: 99.4 (15 Apr 2023 09:21)  HR: 89 (15 Apr 2023 09:21) (89 - 89)  BP: 130/86 (15 Apr 2023 09:21) (130/86 - 130/86)  SpO2: 98% (15 Apr 2023 09:21) (98% - 98%)    Parameters below as of 15 Apr 2023 09:21  Patient On (Oxygen Delivery Method): room air    PHYSICAL EXAM:  GEN: Nontoxic appearing, no acute distress, awake and alert. No drooling or pooling of secretions. No stridor or stertor. Muffled vocal quality.   SKIN: Non diaphoretic.  HEENT: ++Trismus. Oral mucosa pink and moist. Exam very limited 2/2 trismus but left peritonsillar edema noted. Unable to visualized uvula or posterior oropharynx. No erythema or edema noted to buccal mucosa, tongue, FOM.  NECK: Trachea midline, neck supple. Left neck tenderness to palpation.  RESP: No dyspnea, non-labored breathing. No use of accessory muscles.   CARDIO: +S1/S2  ABDO: Soft, NT.  EXT: CEBALLOS x 4    Video FFL: Uvula edematous & pendulous. Left laryngeal edema, without significant erythema, no drainage noted. Mild edema to bilateral arytenoids. Epiglottis crisp, no edema. Scant thick white secretions around vallecula & base of tongue. TVC/FVC mobile and intact.    LABS:             16.1   9.58  )-----------( 307      ( 15 Apr 2023 10:50 )             45.7     04-15  138  |  95<L>  |  14  ----------------------------<  104<H>  4.1   |  29  |  1.0    Ca    9.8      15 Apr 2023 10:50    TPro  8.1<H>  /  Alb  4.7  /  TBili  1.0  /  DBili  x   /  AST  21  /  ALT  43<H>  /  AlkPhos  108  04-15    RADIOLOGY & ADDITIONAL STUDIES:  < from: CT Neck Soft Tissue w/ IV Cont (04.15.23 @ 11:42) >  Findings:    Aerodigestive structures: There is inflammation of the bilateral palatine   tonsils, left worse than right. There is 1 cm rim enhancement within the   anterior aspect of the left palatine tonsil which could reflect small   intratonsillar abscess. There is an additional 1 cm partially   rim-enhancing collection in the caudal aspect of the left palatine   tonsil, possibly reflecting decompressed abscess (5-129).    There is a 1.8 cm rim enhancementin the superior to the left palatine   tonsil likely representing peritonsillar abscess.    There is associated bilateral peritonsillar edema extending to the soft   palate and uvula.    Thyroid gland: Normal.  Parotid and submandibular glands:  Normal.    Lymph nodes: Multiple enlarged left cervical and supraclavicular lymph   nodes, largest one measuring up to 1.8 cm in the left level II station.    Vascular structures: Normal.    Osseous structures: No fracture, dislocation or destructive lesion.   Degenerative changes of the spine. Trace anterolisthesis of C2 on C3.   Trace retrolisthesis of a foreign C5.    Paranasal sinuses: Mild left frontal sinus mucosal thickening..  Mastoid air cells:  Clear.    Partially visualized intracranial structures: Normal.    Partially visualized lung apices: Normal.    Impression:    Two 1 cm intratonsillar abscesses in the left palatine tonsil with   probable partial decompression of the more caudal abscess. Additional 1.7   cm peritonsillar abscess in the superior aspect of the left palatine   tonsil. Associated peritonsillar edema with thickening of the soft palate   and uvula. Mild oropharyngeal airway narrowing.    Left cervical lymphadenopathy, likely reactive.    Dr. Caprice Tejeda ([id number])discussed the PRELIMINARY findings with Dr. Lisa Millard at 4/15/2023 12:56 PM with read back confirmation.   Communication:    The summary of above modified findings were discussed with readback   confirmation with Dr. Millard by radiologist Dr. Huggins on 4/15/2023 at 2:25 PM.  < end of copied text >

## 2023-04-15 NOTE — H&P ADULT - NSHPLABSRESULTS_GEN_ALL_CORE
16.1   9.58  )-----------( 307      ( 15 Apr 2023 10:50 )             45.7       04-15    138  |  95<L>  |  14  ----------------------------<  104<H>  4.1   |  29  |  1.0    Ca    9.8      15 Apr 2023 10:50    TPro  8.1<H>  /  Alb  4.7  /  TBili  1.0  /  DBili  x   /  AST  21  /  ALT  43<H>  /  AlkPhos  108  04-15            Lactate Trend  04-15 @ 10:50 Lactate:1.0

## 2023-04-15 NOTE — H&P ADULT - ATTENDING COMMENTS
Agree with A/P above by resident.   Pt feeling much better by now.    < from: CT Neck Soft Tissue w/ IV Cont (04.15.23 @ 11:42) >    Two 1 cm intratonsillar abscesses in the left palatine tonsil with   probable partial decompression of the more caudal abscess. Additional 1.7   cm peritonsillar abscess in the superior aspect of the left palatine   tonsil. Associated peritonsillar edema with thickening of the soft palate   and uvula. Mild oropharyngeal airway narrowing.    Left cervical lymphadenopathy, likely reactive.    < end of copied text >    s/p Video FFL >> Uvulitis and laryngeal edema with patent airway   Possible FNA tomorrow.    Will resume clear liquid diet.   c/w IV Abx and decadron.    Plan d/w the patient at bedside.

## 2023-04-15 NOTE — H&P ADULT - ASSESSMENT
Pt is a 59yo Male with a PMH including HTN (not on any meds), cholelithiasis, alcohol use disorder (quit alcohol) who presents to the hospital with left sided sore throat x 4-5 days.    #Peritonsillar abscess  CT shows Two 1 cm intratonsillar abscesses in the left palatine tonsil with probable partial decompression of the more caudal abscess. Additional 1.7 cm peritonsillar abscess in the superior aspect of the left palatine tonsil. Associated peritonsillar edema with thickening of the soft palate and uvula. Mild oropharyngeal airway narrowing. L cervical LAD  ID consult  Dexa per ENT   c/w unsayn   F/u ENT for drainage   pain control   monitor airway in SDU     - pt states he is on lasix 40 mg at home for abdominal bloating ?    #Misc  - DVT Prophylaxis: IMPROVEDD 0  - GI Prophylaxis: none   - Diet: full liquid   - Activity: AAT  - IV Fluids: LR  - Code Status: full

## 2023-04-15 NOTE — CONSULT NOTE ADULT - ASSESSMENT
Pt is a 57yo Male with a PMH including HTN, cholelithiasis who presents to the hospital with left sided sore throat x 4-5 days. Intraoral exam limited but concern for L PTA, on on initial review of CT, L tonsillar abscess & uvulitis.    PLAN:  -F/u final CT neck read  -Admission for IV abx -- currently on Unasyn  -ID c/s  -Decadron x 10 in ED, would continue 8mg q6hr for now  -When trismus improved, will plan for FNA of L PTA & will send culture  -Pain control  -Discussed w/ Dr. John Pt is a 57yo Male with a PMH including HTN, cholelithiasis who presents to the hospital with left sided sore throat x 4-5 days. Intraoral exam limited but concern for L PTA, on on initial review of CT, L tonsillar abscess & uvulitis.    PLAN:  -F/u CT neck read  -Admission for IV abx -- currently on Unasyn  -ID c/s  -Decadron x 10 in ED, would continue 8mg q6hr for now and reassess QD whether can taper  -When trismus improved, will plan for FNA of L PTA & will send culture  -Airway monitoring  -Pain control  -Discussed w/ Dr. John    ADDENDUM @ 15:05:  -CT neck reviewed w/ PTA & two intratonsillar abscesses  -Video FFL performed - uvulitis & L laryngeal edema, airway patent  -Pt reports feeling improved, still w/ trismus & limited intraoral exam  -Will discuss w/ attending

## 2023-04-16 ENCOUNTER — TRANSCRIPTION ENCOUNTER (OUTPATIENT)
Age: 58
End: 2023-04-16

## 2023-04-16 PROCEDURE — ZZZZZ: CPT

## 2023-04-16 PROCEDURE — 99222 1ST HOSP IP/OBS MODERATE 55: CPT

## 2023-04-16 PROCEDURE — 99232 SBSQ HOSP IP/OBS MODERATE 35: CPT

## 2023-04-16 RX ADMIN — Medication 40 MILLIGRAM(S): at 06:07

## 2023-04-16 RX ADMIN — Medication 8 MILLIGRAM(S): at 11:08

## 2023-04-16 RX ADMIN — AMPICILLIN SODIUM AND SULBACTAM SODIUM 200 GRAM(S): 250; 125 INJECTION, POWDER, FOR SUSPENSION INTRAMUSCULAR; INTRAVENOUS at 11:08

## 2023-04-16 RX ADMIN — AMPICILLIN SODIUM AND SULBACTAM SODIUM 200 GRAM(S): 250; 125 INJECTION, POWDER, FOR SUSPENSION INTRAMUSCULAR; INTRAVENOUS at 00:43

## 2023-04-16 RX ADMIN — Medication 15 MILLIGRAM(S): at 08:25

## 2023-04-16 RX ADMIN — AMPICILLIN SODIUM AND SULBACTAM SODIUM 200 GRAM(S): 250; 125 INJECTION, POWDER, FOR SUSPENSION INTRAMUSCULAR; INTRAVENOUS at 18:23

## 2023-04-16 RX ADMIN — Medication 15 MILLIGRAM(S): at 09:00

## 2023-04-16 RX ADMIN — Medication 8 MILLIGRAM(S): at 01:30

## 2023-04-16 RX ADMIN — AMPICILLIN SODIUM AND SULBACTAM SODIUM 200 GRAM(S): 250; 125 INJECTION, POWDER, FOR SUSPENSION INTRAMUSCULAR; INTRAVENOUS at 23:39

## 2023-04-16 RX ADMIN — AMPICILLIN SODIUM AND SULBACTAM SODIUM 200 GRAM(S): 250; 125 INJECTION, POWDER, FOR SUSPENSION INTRAMUSCULAR; INTRAVENOUS at 06:10

## 2023-04-16 RX ADMIN — SODIUM CHLORIDE 70 MILLILITER(S): 9 INJECTION, SOLUTION INTRAVENOUS at 06:16

## 2023-04-16 NOTE — CONSULT NOTE ADULT - NEGATIVE ENMT SYMPTOMS
no hearing difficulty/no ear pain/no tinnitus/no vertigo/no sinus symptoms/no nasal congestion/no nasal obstruction/no post-nasal discharge/no nose bleeds/no abnormal taste sensation

## 2023-04-16 NOTE — DISCHARGE NOTE PROVIDER - HOSPITAL COURSE
57yo Male with a PMH including HTN (not on any meds), cholelithiasis, alcohol use disorder (quit alcohol) who presents to the hospital with left sided sore throat x 4-5 days. Found to have left peritonsillitis c/b abscess. SP i/d by ENT team. Clinically improved with IV abx and steroids. Medically stable for dc today on oral antibiotics. 59yo Male with a PMH including HTN (not on any meds), cholelithiasis, alcohol use disorder (quit alcohol) who presents to the hospital with left sided sore throat x 4-5 days. Found to have left peritonsillitis c/b abscess. SP i/d by ENT team. Clinically improved with IV unasyn and steroids. Pt has been able to tolerate po. Medically stable for dc today on oral augmentin until 4/28 and steroids until 4/19. Willl need op ENT follow up.

## 2023-04-16 NOTE — DISCHARGE NOTE PROVIDER - CARE PROVIDER_API CALL
Delmer Siegel)  Otolaryngology  [address] [place] [address], [address] [place] [address]  Phone: [phone]  Fax: [phone]  Follow Up Time: 2 weeks

## 2023-04-16 NOTE — CONSULT NOTE ADULT - SUBJECTIVE AND OBJECTIVE BOX
Patient is a 58y old  Male who presents with a chief complaint of peritonsillar abscess (15 Apr 2023 15:02)      HPI:  Pt is a 59yo Male with a PMH including HTN (not on any meds), cholelithiasis, alcohol use disorder (quit alcohol) who presents to the hospital with left sided sore throat x 4-5 days. Patient states that pain began suddenly and is located primarily on the left. Pt reports associated dysphagia and odynophagia initially to solids but then also to liquids. Also reports increasingly muffled voice and reports having difficulty speaking last night secondary to pain. States that he tried taking tylenol, ibuprofen, and robitussin without relief. Pt thought it was related to his teeth and saw his dentist, but denied any work being done at that time. States that pain acutely worsening overnight and that he went to an urgent care today, who then sent him to the hospital. Endorses chills and states that he felt SOB last night when laying down in bed, which improved when he sat up. Denies fever, chest pain, drooling or inability to tolerate secretions, other complaints.     In ED, VS within normal limits, labs unremarkable.   CT neck: Two 1 cm intratonsillar abscesses in the left palatine tonsil with probable partial decompression of the more caudal abscess. Additional 1.7 cm peritonsillar abscess in the superior aspect of the left palatine tonsil. Associated peritonsillar edema with thickening of the soft palate and uvula. Mild oropharyngeal airway narrowing. L cervical LAD  Received unasyn, dexa, fluids (15 Apr 2023 15:02)      PAST MEDICAL & SURGICAL HISTORY:  No pertinent past medical history      No significant past surgical history          SOCIAL HX:   Smoking    Positive                      ETOH      HO                       Other    FAMILY HISTORY:  :  No known cardiovacular family hisotry     Review Of Systems:     All ROS are negative except per HPI       Allergies    No Known Allergies    Intolerances          PHYSICAL EXAM    ICU Vital Signs Last 24 Hrs  T(C): 37 (15 Apr 2023 15:48), Max: 37.4 (15 Apr 2023 09:21)  T(F): 98.6 (15 Apr 2023 15:48), Max: 99.4 (15 Apr 2023 09:21)  HR: 70 (16 Apr 2023 06:42) (70 - 89)  BP: 119/83 (16 Apr 2023 06:42) (112/76 - 130/86)  BP(mean): 96 (16 Apr 2023 06:42) (96 - 96)  ABP: --  ABP(mean): --  RR: 18 (16 Apr 2023 06:42) (18 - 18)  SpO2: 100% (16 Apr 2023 06:42) (98% - 100%)    O2 Parameters below as of 16 Apr 2023 06:42  Patient On (Oxygen Delivery Method): room air            CONSTITUTIONAL:  Well nourished.   NAD    ENT:   Airway patent,   Mouth with normal mucosa.   No stridor       CARDIAC:   Normal rate,   Regular rhythm.          RESPIRATORY:   No wheezing  Bilateral BS   Not tachypneic,  No use of accessory muscles    GASTROINTESTINAL:  Abdomen soft,   Non-tender,   No guarding,   + BS      NEUROLOGICAL:   Alert and oriented   No motor deficits.    SKIN:   Skin normal color for race,   No evidence of rash.                LABS:                          16.1   9.58  )-----------( 307      ( 15 Apr 2023 10:50 )             45.7                                               04-15    138  |  95<L>  |  14  ----------------------------<  104<H>  4.1   |  29  |  1.0    Ca    9.8      15 Apr 2023 10:50    TPro  8.1<H>  /  Alb  4.7  /  TBili  1.0  /  DBili  x   /  AST  21  /  ALT  43<H>  /  AlkPhos  108  04-15                                                                                           LIVER FUNCTIONS - ( 15 Apr 2023 10:50 )  Alb: 4.7 g/dL / Pro: 8.1 g/dL / ALK PHOS: 108 U/L / ALT: 43 U/L / AST: 21 U/L / GGT: x                                                                                                                                       X-Rays reviewed                                                                                     ECHO        MEDICATIONS  (STANDING):  ampicillin/sulbactam  IVPB 3 Gram(s) IV Intermittent every 6 hours  dexAMETHasone  Injectable 8 milliGRAM(s) IV Push every 8 hours  furosemide    Tablet 40 milliGRAM(s) Oral daily  lactated ringers. 1000 milliLiter(s) (70 mL/Hr) IV Continuous <Continuous>    MEDICATIONS  (PRN):  acetaminophen     Tablet .. 650 milliGRAM(s) Oral every 6 hours PRN Temp greater or equal to 38C (100.4F), Mild Pain (1 - 3)  ketorolac   Injectable 15 milliGRAM(s) IV Push three times a day PRN Severe Pain (7 - 10)

## 2023-04-16 NOTE — CONSULT NOTE ADULT - ASSESSMENT
57yo Male with a PMH including HTN (not on any meds), cholelithiasis, alcohol use disorder (quit alcohol) who presents to the hospital with left sided sore throat x 4-5 days. Patient states that pain began suddenly and is located primarily on the left. Pt reports associated dysphagia and odynophagia initially to solids but then also to liquids. Also reports increasingly muffled voice and reports having difficulty speaking last night secondary to pain.  CT neck: Two 1 cm intratonsillar abscesses in the left palatine tonsil with probable partial decompression of the more caudal abscess. Additional 1.7 cm peritonsillar abscess in the superior aspect of the left palatine tonsil. Associated peritonsillar edema with thickening of the soft palate and uvula. Mild oropharyngeal airway narrowing. L cervical LAD.    IMPRESSION/RECOMMENDATIONS   Acute left tonsillitis with left two 1 cm intratonsillar abscesses.  Clinically has improved with resolution of the odynophagia.  WBC 9.5  -f/u with ENT for possible therapeutic draiange  Unasyn 3 gm iv a6h > change to po Augmentin 875 mg q12h till 4/28 once cleared by ENT    Please do not hesitate to recall ID if any questions arise either through [facility] or through [employer] teams

## 2023-04-16 NOTE — CONSULT NOTE ADULT - SUBJECTIVE AND OBJECTIVE BOX
AISHA AMBAR  58y, Male  Allergy: No Known Allergies      All historical available data reviewed.    HPI:  Pt is a 57yo Male with a PMH including HTN (not on any meds), cholelithiasis, alcohol use disorder (quit alcohol) who presents to the hospital with left sided sore throat x 4-5 days. Patient states that pain began suddenly and is located primarily on the left. Pt reports associated dysphagia and odynophagia initially to solids but then also to liquids. Also reports increasingly muffled voice and reports having difficulty speaking last night secondary to pain. States that he tried taking tylenol, ibuprofen, and robitussin without relief. Pt thought it was related to his teeth and saw his dentist, but denied any work being done at that time. States that pain acutely worsening overnight and that he went to an urgent care today, who then sent him to the hospital. Endorses chills and states that he felt SOB last night when laying down in bed, which improved when he sat up. Denies fever, chest pain, drooling or inability to tolerate secretions, other complaints.     In ED, VS within normal limits, labs unremarkable.   CT neck: Two 1 cm intratonsillar abscesses in the left palatine tonsil with probable partial decompression of the more caudal abscess. Additional 1.7 cm peritonsillar abscess in the superior aspect of the left palatine tonsil. Associated peritonsillar edema with thickening of the soft palate and uvula. Mild oropharyngeal airway narrowing. L cervical LAD  Received unasyn, dexa, fluids (15 Apr 2023 15:02)    FAMILY HISTORY:    PAST MEDICAL & SURGICAL HISTORY:  No pertinent past medical history      No significant past surgical history            VITALS:  T(F): 98.1, Max: 99.4 (04-15-23 @ 09:21)  HR: 72  BP: 132/84  RR: 16Vital Signs Last 24 Hrs  T(C): 36.7 (16 Apr 2023 07:17), Max: 37.4 (15 Apr 2023 09:21)  T(F): 98.1 (16 Apr 2023 07:17), Max: 99.4 (15 Apr 2023 09:21)  HR: 72 (16 Apr 2023 07:17) (70 - 89)  BP: 132/84 (16 Apr 2023 07:17) (112/76 - 132/84)  BP(mean): 96 (16 Apr 2023 06:42) (96 - 96)  RR: 16 (16 Apr 2023 07:17) (16 - 18)  SpO2: 100% (16 Apr 2023 07:17) (98% - 100%)    Parameters below as of 16 Apr 2023 07:17  Patient On (Oxygen Delivery Method): room air        TESTS & MEASUREMENTS:                        16.1   9.58  )-----------( 307      ( 15 Apr 2023 10:50 )             45.7     04-15    138  |  95<L>  |  14  ----------------------------<  104<H>  4.1   |  29  |  1.0    Ca    9.8      15 Apr 2023 10:50    TPro  8.1<H>  /  Alb  4.7  /  TBili  1.0  /  DBili  x   /  AST  21  /  ALT  43<H>  /  AlkPhos  108  04-15    LIVER FUNCTIONS - ( 15 Apr 2023 10:50 )  Alb: 4.7 g/dL / Pro: 8.1 g/dL / ALK PHOS: 108 U/L / ALT: 43 U/L / AST: 21 U/L / GGT: x                   RADIOLOGY & ADDITIONAL TESTS:  Personal review of radiological diagnostics performed  Echo and EKG results noted when applicable.     MEDICATIONS:  acetaminophen     Tablet .. 650 milliGRAM(s) Oral every 6 hours PRN  ampicillin/sulbactam  IVPB 3 Gram(s) IV Intermittent every 6 hours  dexAMETHasone  Injectable 8 milliGRAM(s) IV Push every 8 hours  furosemide    Tablet 40 milliGRAM(s) Oral daily  ketorolac   Injectable 15 milliGRAM(s) IV Push three times a day PRN  lactated ringers. 1000 milliLiter(s) IV Continuous <Continuous>      ANTIBIOTICS:  ampicillin/sulbactam  IVPB 3 Gram(s) IV Intermittent every 6 hours

## 2023-04-16 NOTE — CONSULT NOTE ADULT - ASSESSMENT
IMPRESSION:    Peritonsillar abscesses with no evidence of airway compromise     PLAN:    CNS:  no depressants     HEENT: Oral care.  ENT follow up     PULMONARY:  HOB @ 45 degrees.  Aspiration precautions.  On RA.  CXR.      CARDIOVASCULAR:  Avoid overload     GI: GI prophylaxis.  Feeding.  Bowel regimen     RENAL:  Follow up lytes.  Correct as needed    INFECTIOUS DISEASE: Follow up cultures.  Continue Unasyn for now     HEMATOLOGICAL:  DVT prophylaxis.  Dimer     ENDOCRINE:  Follow up FS.      MUSCULOSKELETAL:  AAT    DGTF

## 2023-04-16 NOTE — DISCHARGE NOTE PROVIDER - NSDCMRMEDTOKEN_GEN_ALL_CORE_FT
Lasix 40 mg oral tablet: 1 tab(s) orally once a day   amoxicillin-clavulanate 875 mg-125 mg oral tablet: 1 tab(s) orally every 12 hours x 12 days  Lasix 40 mg oral tablet: 1 tab(s) orally once a day  predniSONE 20 mg oral tablet: 2 tab(s) orally once a day

## 2023-04-16 NOTE — PROCEDURE NOTE - ADDITIONAL PROCEDURE DETAILS
Risk vs benefits of FNA discussed with pt who gave verbal consent to proceed with planned procedure.    Hurricaine spray applied x 2 & pain meds given via IV. 19g needle then used to aspirate from left peritonsillar abscess, ultimately aspirated ~0.5ccs of thick purulent material which was sent to lab for culture.

## 2023-04-16 NOTE — DISCHARGE NOTE PROVIDER - NSDCCPCAREPLAN_GEN_ALL_CORE_FT
PRINCIPAL DISCHARGE DIAGNOSIS  Diagnosis: Tonsillar abscess  Assessment and Plan of Treatment: Complete course of oral antibiotics as prescribed. Follow up with ENT clinic in 2 weeks of discharge.     PRINCIPAL DISCHARGE DIAGNOSIS  Diagnosis: Tonsillar abscess  Assessment and Plan of Treatment: Please take augmentin until 4/28 and steroids until 4/19. Please follow up with ENT and your PCP. You were NOT on any opioids while admitted.

## 2023-04-16 NOTE — CONSULT NOTE ADULT - GENITOURINARY
Za Hamiltondo Botello  Will Castellon MD  Vascular & Endovascular Surgery          negative

## 2023-04-17 ENCOUNTER — TRANSCRIPTION ENCOUNTER (OUTPATIENT)
Age: 58
End: 2023-04-17

## 2023-04-17 VITALS
OXYGEN SATURATION: 94 % | SYSTOLIC BLOOD PRESSURE: 126 MMHG | HEART RATE: 75 BPM | DIASTOLIC BLOOD PRESSURE: 68 MMHG | RESPIRATION RATE: 18 BRPM

## 2023-04-17 PROCEDURE — 99239 HOSP IP/OBS DSCHRG MGMT >30: CPT

## 2023-04-17 PROCEDURE — 99232 SBSQ HOSP IP/OBS MODERATE 35: CPT

## 2023-04-17 RX ADMIN — Medication 40 MILLIGRAM(S): at 05:41

## 2023-04-17 RX ADMIN — AMPICILLIN SODIUM AND SULBACTAM SODIUM 200 GRAM(S): 250; 125 INJECTION, POWDER, FOR SUSPENSION INTRAMUSCULAR; INTRAVENOUS at 11:01

## 2023-04-17 RX ADMIN — AMPICILLIN SODIUM AND SULBACTAM SODIUM 200 GRAM(S): 250; 125 INJECTION, POWDER, FOR SUSPENSION INTRAMUSCULAR; INTRAVENOUS at 05:42

## 2023-04-17 NOTE — PROGRESS NOTE ADULT - ASSESSMENT
Pt is a 58y Male admitted with L PTA, L intratonsillar abscess, & uvulitis s/p FNA with aspiration of purulent material on 4/16.  reports feeling much better. Denies pain at rest, and reports tolerating po without issues. Vocal quality now baseline. Otherwise denies any complaints at this time. No f/c/n/v/cp/SOB/diff breathing.    Patient seen and examined at bedside with Dr Siegel, recommendations below:  -No further acute ENT/surgical intervention at this time  -s/p L PTA FNA; F/u culture  -C/w abx -- on Unasyn  -Taper steroids  -Soft diet as tolerated  -Pain control prn  -Stable for discharge with f/u from ENT standpoint   -Recall prn  
 57yo Male with a PMH including HTN (not on any meds), cholelithiasis, alcohol use disorder (quit alcohol) who presents to the hospital with left sided sore throat x 4-5 days.    #Peritonsillar abscess  CT shows Two 1 cm intratonsillar abscesses in the left palatine tonsil with probable partial decompression of the more caudal abscess. Additional 1.7 cm peritonsillar abscess in the superior aspect of the left palatine tonsil. Associated peritonsillar edema with thickening of the soft palate and uvula. Mild oropharyngeal airway narrowing. L cervical LAD  s/p I/D by ENT, but was only able to aspirate 0.5cc  - Cont unasyn --> Plan for augmentin on DC through 04/28  - f/u cultures  - Per ENT, observe overnight, DC tomorrow AM if clinically stable  - pain control   - taper of decadron --> prednisone 40mg for 3 more days (04/17 - 04/19)  - Downgrade to GMF    #HTN  - Cont lasix    DVT PPX, low risk, pt is ambulatory    #Progress Note Handoff  Pending (specify): Monitor overnight, cont IV abx  Family discussion: dw pt regarding tx for tonsillar abscess  Disposition: Home
Pt is a 58y Male admitted with L PTA, L intratonsillar abscess, & uvulitis -- s/p FNA of L PTA today.    PLAN:  -s/p L PTA FNA  -F/u culture  -C/w abx -- on Unasyn  -Taper steroids  -Soft diet as tolerated  -Pain control prn  -Stable for downgrade from ENT standpoint  -Will discuss with attending

## 2023-04-17 NOTE — DISCHARGE NOTE NURSING/CASE MANAGEMENT/SOCIAL WORK - PATIENT PORTAL LINK FT
You can access the FollowMyHealth Patient Portal offered by [facility] by registering at the following website: [email or web address]. By joining [employer]’s FollowMyHealth portal, you will also be able to view your health information using other applications (apps) compatible with our system.

## 2023-04-17 NOTE — PROGRESS NOTE ADULT - SUBJECTIVE AND OBJECTIVE BOX
ENT Progress Note:   Pt is a 58y Male admitted with L PTA, L intratonsillar abscess, & uvulitis s/p FNA with aspiration of purulent material on 4/16.  Patient seen and examined at bedside with Dr Siegel this morning, reports feeling much better. Denies pain at rest, and reports tolerating po without issues. Vocal quality now baseline. Otherwise denies any complaints at this time. No f/c/n/v/cp/SOB/diff breathing.    PAST MEDICAL & SURGICAL HISTORY:  No pertinent past medical history  No significant past surgical history    Allergie: No Known Allergies    MEDICATIONS  (STANDING):  ampicillin/sulbactam  IVPB 3 Gram(s) IV Intermittent every 6 hours  furosemide    Tablet 40 milliGRAM(s) Oral daily  lactated ringers. 1000 milliLiter(s) (70 mL/Hr) IV Continuous <Continuous>  predniSONE   Tablet 40 milliGRAM(s) Oral daily    MEDICATIONS  (PRN):  acetaminophen     Tablet .. 650 milliGRAM(s) Oral every 6 hours PRN Temp greater or equal to 38C (100.4F), Mild Pain (1 - 3)  ketorolac   Injectable 15 milliGRAM(s) IV Push three times a day PRN Severe Pain (7 - 10)    Social History:  lives by himself,    Used to drink "a lot" of alcohol, quit now   smokes 1/2 PPD, 20 pack years (15 Apr 2023 15:02)      [ x ] A 10 Point Review of Systems was negative except where noted      Vital Signs Last 24 Hrs  T(C): 35.7 (17 Apr 2023 04:54), Max: 36.8 (16 Apr 2023 15:59)  T(F): 96.3 (17 Apr 2023 04:54), Max: 98.3 (16 Apr 2023 15:59)  HR: 75 (17 Apr 2023 08:03) (68 - 85)  BP: 126/68 (17 Apr 2023 08:03) (124/77 - 156/82)  BP(mean): 89 (16 Apr 2023 15:59) (89 - 89)  RR: 18 (17 Apr 2023 08:03) (18 - 18)  SpO2: 94% (17 Apr 2023 08:03) (94% - 100%)    Parameters below as of 17 Apr 2023 08:03  Patient On (Oxygen Delivery Method): room air    PHYSICAL EXAM:  Gen: Well-developed, well-nourished, NAD.  No drooling or pooling of secretions.  Good vocal quality, no hoarseness  Skin: Good color, non diaphoretic  Head: NC/AT.   EENT: Nares bilaterally patent, no blood or discharge noted. Oral cavity no erythema/edema. Tongue wnl, uvula midline, no tenderness throughout FOM. Posterior oropharynx clear, + area of ecchymosis to left post OP, no edema or fluctuance noted.   Neck: Trachea midline, supple  Resp: Breathing easily, no accessory muscle use, no stridor or stertor.    Cardio: +S1/S2  Abd: Soft, nontender, nondistended  Neuro: Awake and alert  Psych: Normal mood, normal affect  Ext: No peripheral edema/cyanosis, CEBALLOS x 4    LABS:                      16.1   9.58  )-----------( 307      ( 15 Apr 2023 10:50 )             45.7     04-15    138  |  95<L>  |  14  ----------------------------<  104<H>  4.1   |  29  |  1.0    Ca    9.8      15 Apr 2023 10:50    TPro  8.1<H>  /  Alb  4.7  /  TBili  1.0  /  DBili  x   /  AST  21  /  ALT  43<H>  /  AlkPhos  108  04-15
  AISHA AMBAR  58y, Male  Allergy: No Known Allergies    Hospital Day: 1d    Patient seen and examined. No acute events overnight    PMH/PSH:  PAST MEDICAL & SURGICAL HISTORY:  No pertinent past medical history      No significant past surgical history          VITALS:  T(F): 98.1 (04-16-23 @ 07:17), Max: 98.6 (04-15-23 @ 15:48)  HR: 76 (04-16-23 @ 09:34)  BP: 118/71 (04-16-23 @ 09:34) (112/76 - 132/84)  RR: 18 (04-16-23 @ 09:34)  SpO2: 98% (04-16-23 @ 09:34)    TESTS & MEASUREMENTS:  Weight (Kg): 72.1 (04-15-23 @ 09:21)  BMI (kg/m2): 24.2 (04-15)                          16.1   9.58  )-----------( 307      ( 15 Apr 2023 10:50 )             45.7       04-15    138  |  95<L>  |  14  ----------------------------<  104<H>  4.1   |  29  |  1.0    Ca    9.8      15 Apr 2023 10:50    TPro  8.1<H>  /  Alb  4.7  /  TBili  1.0  /  DBili  x   /  AST  21  /  ALT  43<H>  /  AlkPhos  108  04-15    LIVER FUNCTIONS - ( 15 Apr 2023 10:50 )  Alb: 4.7 g/dL / Pro: 8.1 g/dL / ALK PHOS: 108 U/L / ALT: 43 U/L / AST: 21 U/L / GGT: x                     RADIOLOGY & ADDITIONAL TESTS:    RECENT DIAGNOSTIC ORDERS:  Diet, Soft and Bite Sized (04-16-23 @ 10:58)  Culture - Other: Routine  Specimen Source: throat (04-16-23 @ 09:03)      MEDICATIONS:  MEDICATIONS  (STANDING):  ampicillin/sulbactam  IVPB 3 Gram(s) IV Intermittent every 6 hours  dexAMETHasone  Injectable 8 milliGRAM(s) IV Push every 8 hours  furosemide    Tablet 40 milliGRAM(s) Oral daily  lactated ringers. 1000 milliLiter(s) (70 mL/Hr) IV Continuous <Continuous>    MEDICATIONS  (PRN):  acetaminophen     Tablet .. 650 milliGRAM(s) Oral every 6 hours PRN Temp greater or equal to 38C (100.4F), Mild Pain (1 - 3)  ketorolac   Injectable 15 milliGRAM(s) IV Push three times a day PRN Severe Pain (7 - 10)      HOME MEDICATIONS:  Lasix 40 mg oral tablet (04-15)      REVIEW OF SYSTEMS:  All other review of systems is negative unless indicated above.     PHYSICAL EXAM:  PHYSICAL EXAM:  GENERAL: NAD, well-developed  HEAD:  Atraumatic, Normocephalic  NECK: Supple, No JVD  CHEST/LUNG: Clear to auscultation bilaterally; No wheeze  HEART: Regular rate and rhythm; No murmurs, rubs, or gallops  ABDOMEN: Soft, Nontender, Nondistended; Bowel sounds present  EXTREMITIES:  2+ Peripheral Pulses, No clubbing, cyanosis, or edema  SKIN: No rashes or lesions      
ENT DAILY PROGRESS NOTE    Pt is a 58y Male admitted with L PTA, L intratonsillar abscess, & uvulitis. Patient seen and examined at bedside this morning, reports feeling much better. Denies pain at rest, reports slight odynophagia with liquids. Also notes improving trismus & that vocal quality is nearly back to baseline. Otherwise denies any complaints at this time.    REVIEW OF SYSTEMS   [x] A ten-point review of systems was otherwise negative except as noted.    Allergies  No Known Allergies    MEDICATIONS:  acetaminophen     Tablet .. 650 milliGRAM(s) Oral every 6 hours PRN  ampicillin/sulbactam  IVPB 3 Gram(s) IV Intermittent every 6 hours  dexAMETHasone  Injectable 8 milliGRAM(s) IV Push every 8 hours  furosemide    Tablet 40 milliGRAM(s) Oral daily  ketorolac   Injectable 15 milliGRAM(s) IV Push three times a day PRN  lactated ringers. 1000 milliLiter(s) IV Continuous <Continuous>    Vital Signs Last 24 Hrs  T(C): 36.7 (16 Apr 2023 07:17), Max: 37 (15 Apr 2023 15:48)  T(F): 98.1 (16 Apr 2023 07:17), Max: 98.6 (15 Apr 2023 15:48)  HR: 76 (16 Apr 2023 09:34) (70 - 86)  BP: 118/71 (16 Apr 2023 09:34) (112/76 - 132/84)  BP(mean): 96 (16 Apr 2023 06:42) (96 - 96)  RR: 18 (16 Apr 2023 09:34) (16 - 18)  SpO2: 98% (16 Apr 2023 09:34) (98% - 100%)    Parameters below as of 16 Apr 2023 09:34  Patient On (Oxygen Delivery Method): room air    PHYSICAL EXAM:  GEN: NAD, awake and alert. No drooling or pooling of secretions. No stridor or stertor. Slightly muffled, much improved from prior exam.   SKIN: Nondiaphoretic  HEENT: Oral mucosa pink and moist. Left peritonsillar erythema w/ mild edema, fluctuance noted & tender to palpation w/ tongue depressor. Edematous uvula w/ slight deviation.   NECK: Trachea midline. Neck supple, no TTP to B/L lateral neck.  RESP: Non-labored breathing. No use of accessory muscles.  CARDIO: +S1/S2  ABDO: Soft, NT.  EXT: CEBALLOS x 4    LABS:  CBC-             16.1   9.58  )-----------( 307      ( 15 Apr 2023 10:50 )             45.7     BMP/CMP-15 Apr 2023 10:50  138    |  95     |  14     ----------------------------<  104    4.1     |  29     |  1.0      Ca    9.8        15 Apr 2023 10:50    TPro  8.1    /  Alb  4.7    /  TBili  1.0    /  DBili  x      /  AST  21     /  ALT  43     /  AlkPhos  108    15 Apr 2023 10:50

## 2023-04-17 NOTE — DISCHARGE NOTE NURSING/CASE MANAGEMENT/SOCIAL WORK - NSDCPEFALRISK_GEN_ALL_CORE
For information on Fall & Injury Prevention, visit: [email or web address] OR  [email or web address] OR  https://www.cdc.gov/steadi/patient.html

## 2023-04-18 PROBLEM — Z00.00 ENCOUNTER FOR PREVENTIVE HEALTH EXAMINATION: Status: ACTIVE | Noted: 2023-04-18

## 2023-04-20 LAB
CULTURE RESULTS: SIGNIFICANT CHANGE UP
CULTURE RESULTS: SIGNIFICANT CHANGE UP
SPECIMEN SOURCE: SIGNIFICANT CHANGE UP
SPECIMEN SOURCE: SIGNIFICANT CHANGE UP

## 2023-04-21 LAB
CULTURE RESULTS: SIGNIFICANT CHANGE UP
SPECIMEN SOURCE: SIGNIFICANT CHANGE UP

## 2023-04-27 ENCOUNTER — EMERGENCY (EMERGENCY)
Facility: HOSPITAL | Age: 58
LOS: 0 days | Discharge: LEFT BEFORE DISCHARGE | End: 2023-04-27
Attending: EMERGENCY MEDICINE
Payer: MEDICARE

## 2023-04-27 ENCOUNTER — EMERGENCY (EMERGENCY)
Facility: HOSPITAL | Age: 58
LOS: 0 days | Discharge: HOME | End: 2023-04-28
Attending: STUDENT IN AN ORGANIZED HEALTH CARE EDUCATION/TRAINING PROGRAM
Payer: MEDICARE

## 2023-04-27 VITALS
DIASTOLIC BLOOD PRESSURE: 86 MMHG | HEART RATE: 95 BPM | RESPIRATION RATE: 18 BRPM | SYSTOLIC BLOOD PRESSURE: 129 MMHG | TEMPERATURE: 98 F | OXYGEN SATURATION: 98 % | HEIGHT: 68 IN | WEIGHT: 179.9 LBS

## 2023-04-27 VITALS
HEIGHT: 68 IN | SYSTOLIC BLOOD PRESSURE: 149 MMHG | DIASTOLIC BLOOD PRESSURE: 102 MMHG | RESPIRATION RATE: 20 BRPM | TEMPERATURE: 98 F | HEART RATE: 97 BPM | OXYGEN SATURATION: 95 %

## 2023-04-27 DIAGNOSIS — I10 ESSENTIAL (PRIMARY) HYPERTENSION: ICD-10-CM

## 2023-04-27 DIAGNOSIS — F17.200 NICOTINE DEPENDENCE, UNSPECIFIED, UNCOMPLICATED: ICD-10-CM

## 2023-04-27 DIAGNOSIS — Z53.21 PROCEDURE AND TREATMENT NOT CARRIED OUT DUE TO PATIENT LEAVING PRIOR TO BEING SEEN BY HEALTH CARE PROVIDER: ICD-10-CM

## 2023-04-27 DIAGNOSIS — R45.851 SUICIDAL IDEATIONS: ICD-10-CM

## 2023-04-27 DIAGNOSIS — R69 ILLNESS, UNSPECIFIED: ICD-10-CM

## 2023-04-27 DIAGNOSIS — F31.9 BIPOLAR DISORDER, UNSPECIFIED: ICD-10-CM

## 2023-04-27 DIAGNOSIS — F10.129 ALCOHOL ABUSE WITH INTOXICATION, UNSPECIFIED: ICD-10-CM

## 2023-04-27 PROCEDURE — 86703 HIV-1/HIV-2 1 RESULT ANTBDY: CPT

## 2023-04-27 PROCEDURE — 80307 DRUG TEST PRSMV CHEM ANLYZR: CPT

## 2023-04-27 PROCEDURE — 80053 COMPREHEN METABOLIC PANEL: CPT

## 2023-04-27 PROCEDURE — 99285 EMERGENCY DEPT VISIT HI MDM: CPT

## 2023-04-27 PROCEDURE — 93005 ELECTROCARDIOGRAM TRACING: CPT

## 2023-04-27 PROCEDURE — 36415 COLL VENOUS BLD VENIPUNCTURE: CPT

## 2023-04-27 PROCEDURE — L9992: CPT

## 2023-04-27 PROCEDURE — 87635 SARS-COV-2 COVID-19 AMP PRB: CPT

## 2023-04-27 PROCEDURE — 85025 COMPLETE CBC W/AUTO DIFF WBC: CPT

## 2023-04-27 NOTE — ED ADULT TRIAGE NOTE - CHIEF COMPLAINT QUOTE
pt biba because the friend called 911 and stated that the patient has been drinking lately and has been saying that he wants to die. pt refuses to answer in triage if he is still suicidal. pt biba because the friend called 911 and stated that the patient has been drinking lately and has been saying that he wants to die. pt refuses to answer in triage if he is still suicidal. nsg 1:1 initiated in triage

## 2023-04-28 ENCOUNTER — APPOINTMENT (OUTPATIENT)
Dept: OTOLARYNGOLOGY | Facility: CLINIC | Age: 58
End: 2023-04-28

## 2023-04-28 VITALS
TEMPERATURE: 98 F | DIASTOLIC BLOOD PRESSURE: 68 MMHG | SYSTOLIC BLOOD PRESSURE: 107 MMHG | RESPIRATION RATE: 18 BRPM | HEART RATE: 95 BPM | OXYGEN SATURATION: 97 %

## 2023-04-28 LAB
ALBUMIN SERPL ELPH-MCNC: 4.4 G/DL — SIGNIFICANT CHANGE UP (ref 3.5–5.2)
ALP SERPL-CCNC: 79 U/L — SIGNIFICANT CHANGE UP (ref 30–115)
ALT FLD-CCNC: 101 U/L — HIGH (ref 0–41)
AMPHET UR-MCNC: NEGATIVE — SIGNIFICANT CHANGE UP
ANION GAP SERPL CALC-SCNC: 14 MMOL/L — SIGNIFICANT CHANGE UP (ref 7–14)
APAP SERPL-MCNC: <5 UG/ML — LOW (ref 10–30)
AST SERPL-CCNC: 35 U/L — SIGNIFICANT CHANGE UP (ref 0–41)
BARBITURATES UR SCN-MCNC: NEGATIVE — SIGNIFICANT CHANGE UP
BASOPHILS # BLD AUTO: 0.06 K/UL — SIGNIFICANT CHANGE UP (ref 0–0.2)
BASOPHILS NFR BLD AUTO: 0.9 % — SIGNIFICANT CHANGE UP (ref 0–1)
BENZODIAZ UR-MCNC: NEGATIVE — SIGNIFICANT CHANGE UP
BILIRUB SERPL-MCNC: 0.2 MG/DL — SIGNIFICANT CHANGE UP (ref 0.2–1.2)
BUN SERPL-MCNC: 16 MG/DL — SIGNIFICANT CHANGE UP (ref 10–20)
CALCIUM SERPL-MCNC: 9 MG/DL — SIGNIFICANT CHANGE UP (ref 8.4–10.5)
CHLORIDE SERPL-SCNC: 99 MMOL/L — SIGNIFICANT CHANGE UP (ref 98–110)
CO2 SERPL-SCNC: 24 MMOL/L — SIGNIFICANT CHANGE UP (ref 17–32)
COCAINE METAB.OTHER UR-MCNC: NEGATIVE — SIGNIFICANT CHANGE UP
CREAT SERPL-MCNC: 0.8 MG/DL — SIGNIFICANT CHANGE UP (ref 0.7–1.5)
EGFR: 103 ML/MIN/1.73M2 — SIGNIFICANT CHANGE UP
EOSINOPHIL # BLD AUTO: 0.14 K/UL — SIGNIFICANT CHANGE UP (ref 0–0.7)
EOSINOPHIL NFR BLD AUTO: 2.1 % — SIGNIFICANT CHANGE UP (ref 0–8)
ETHANOL SERPL-MCNC: 256 MG/DL — HIGH
GLUCOSE SERPL-MCNC: 106 MG/DL — HIGH (ref 70–99)
HCT VFR BLD CALC: 43.4 % — SIGNIFICANT CHANGE UP (ref 42–52)
HGB BLD-MCNC: 14.7 G/DL — SIGNIFICANT CHANGE UP (ref 14–18)
HIV 1 & 2 AB SERPL IA.RAPID: SIGNIFICANT CHANGE UP
IMM GRANULOCYTES NFR BLD AUTO: 1.2 % — HIGH (ref 0.1–0.3)
LYMPHOCYTES # BLD AUTO: 3.59 K/UL — HIGH (ref 1.2–3.4)
LYMPHOCYTES # BLD AUTO: 54.4 % — HIGH (ref 20.5–51.1)
MCHC RBC-ENTMCNC: 30.8 PG — SIGNIFICANT CHANGE UP (ref 27–31)
MCHC RBC-ENTMCNC: 33.9 G/DL — SIGNIFICANT CHANGE UP (ref 32–37)
MCV RBC AUTO: 90.8 FL — SIGNIFICANT CHANGE UP (ref 80–94)
METHADONE UR-MCNC: NEGATIVE — SIGNIFICANT CHANGE UP
MONOCYTES # BLD AUTO: 0.59 K/UL — SIGNIFICANT CHANGE UP (ref 0.1–0.6)
MONOCYTES NFR BLD AUTO: 8.9 % — SIGNIFICANT CHANGE UP (ref 1.7–9.3)
NEUTROPHILS # BLD AUTO: 2.14 K/UL — SIGNIFICANT CHANGE UP (ref 1.4–6.5)
NEUTROPHILS NFR BLD AUTO: 32.5 % — LOW (ref 42.2–75.2)
NRBC # BLD: 0 /100 WBCS — SIGNIFICANT CHANGE UP (ref 0–0)
OPIATES UR-MCNC: NEGATIVE — SIGNIFICANT CHANGE UP
PCP SPEC-MCNC: SIGNIFICANT CHANGE UP
PLATELET # BLD AUTO: 246 K/UL — SIGNIFICANT CHANGE UP (ref 130–400)
PMV BLD: 10.3 FL — SIGNIFICANT CHANGE UP (ref 7.4–10.4)
POTASSIUM SERPL-MCNC: 4 MMOL/L — SIGNIFICANT CHANGE UP (ref 3.5–5)
POTASSIUM SERPL-SCNC: 4 MMOL/L — SIGNIFICANT CHANGE UP (ref 3.5–5)
PROPOXYPHENE QUALITATIVE URINE RESULT: NEGATIVE — SIGNIFICANT CHANGE UP
PROT SERPL-MCNC: 7.2 G/DL — SIGNIFICANT CHANGE UP (ref 6–8)
RBC # BLD: 4.78 M/UL — SIGNIFICANT CHANGE UP (ref 4.7–6.1)
RBC # FLD: 11.9 % — SIGNIFICANT CHANGE UP (ref 11.5–14.5)
SALICYLATES SERPL-MCNC: <0.3 MG/DL — LOW (ref 4–30)
SARS-COV-2 RNA SPEC QL NAA+PROBE: SIGNIFICANT CHANGE UP
SODIUM SERPL-SCNC: 137 MMOL/L — SIGNIFICANT CHANGE UP (ref 135–146)
WBC # BLD: 6.6 K/UL — SIGNIFICANT CHANGE UP (ref 4.8–10.8)
WBC # FLD AUTO: 6.6 K/UL — SIGNIFICANT CHANGE UP (ref 4.8–10.8)

## 2023-04-28 PROCEDURE — 90792 PSYCH DIAG EVAL W/MED SRVCS: CPT

## 2023-04-28 PROCEDURE — 93010 ELECTROCARDIOGRAM REPORT: CPT

## 2023-04-28 RX ORDER — THIAMINE MONONITRATE (VIT B1) 100 MG
100 TABLET ORAL ONCE
Refills: 0 | Status: COMPLETED | OUTPATIENT
Start: 2023-04-28 | End: 2023-04-28

## 2023-04-28 RX ADMIN — Medication 50 MILLIGRAM(S): at 05:07

## 2023-04-28 RX ADMIN — Medication 100 MILLIGRAM(S): at 05:12

## 2023-04-28 RX ADMIN — Medication 50 MILLIGRAM(S): at 04:14

## 2023-04-28 NOTE — ED ADULT NURSE NOTE - SCORE
10

## 2023-04-28 NOTE — ED ADULT NURSE NOTE - CHIEF COMPLAINT QUOTE
pt biba because the friend called 911 and stated that the patient has been drinking lately and has been saying that he wants to die. pt refuses to answer in triage if he is still suicidal. nsg 1:1 initiated in triage

## 2023-04-28 NOTE — ED PROVIDER NOTE - NSFOLLOWUPINSTRUCTIONS_ED_ALL_ED_FT
You have been seen for self injurious feelings and depression.  You labs and testing showed no acute abnormality.  You were seen by psychiatry and are safe for discharge.  Follow-up with detox program and with your therapist as discussed.  For new or worsening symptoms, return to the ER.

## 2023-04-28 NOTE — ED BEHAVIORAL HEALTH ASSESSMENT NOTE - REFERRAL / APPOINTMENT DETAILS
Patient will continue therapy with provider Senia at [facility]. He will also meet with the SBIRT team for referral in the  community

## 2023-04-28 NOTE — SBIRT NOTE ADULT - NSSBIRTALCACTIVEREFTXDET_GEN_A_CORE
Referral for complete assessment and level of care determination at a certified treatment facility was completed by giving the patient information for a treatment facility that meets their needs and encouraging him to proceed there for an intake. Patient requested and was provided with referral information to [facility], IP Withdrawal Unit,  [address] [place] [address], [phone]. This counselor contacted this facility and spoke with an intake counselor, Ms Beatty. She reported that beds are available today and recommended patient proceed to their ED for intake evaluation. Patient was provided this information and was in agreement with the plan. Pt provided with phone number for telephonic SBIRT ([phone]) for future follow up.

## 2023-04-28 NOTE — ED BEHAVIORAL HEALTH ASSESSMENT NOTE - HPI (INCLUDE ILLNESS QUALITY, SEVERITY, DURATION, TIMING, CONTEXT, MODIFYING FACTORS, ASSOCIATED SIGNS AND SYMPTOMS)
58 year old male , domiciled alone with reported history of bipolar disorder, currently in therapy treatment, history of alcohol use and prior inpatient rehab program, who presents to the ER with alcohol intoxication and report of suicidal ideation. BAL on arrival to ED was 256, seen for suicidal ideation.    He is awake, alert, oriented at no distress. He claims he has been drinking about 1/2 pint of alcohol daily for the past couple of months, last drink was a few hours prior to coming to ER. He finds it difficulty on his own to stop. It appears yesterday, after a female friend checked on him, he was found to be intoxicated, and during this period of time, he expressed suicidal ideation to this friend who subsequently called the ambulance for patient. Now awake, alert, oriented and inebriated, he strongly denies having any thought of harming himself. He identifies his strongest protective factors as his two young children. Furthermore, this patient denies having prior history of suicidal ideation. He identifies his current problem as his inability "to stop drinking." Patient admits there are multiple stressors affecting him as well, which also plays a role in the drinking. There is an open case an protective order against him, placed by his wife. He has limited access to his young children and all of these are affecting him negatively. He notes he finds his life to be boring, at times feels worthlessness, but expressively denies feeling of hopeless. He is not currently exhibiting any psychosis or manic symptoms, and none is reported from the past. Patient himself questions his diagnosis of bipolar disorder, and further denies prior history of inpatient psychiatry admission.     Other than alcohol, no other substance use is reported. No overt withdrawal symptom noted.    Provider Senia is contacted at [phone]: attempts made to reach out to clinic was unsuccessful.   Patient's friend is contacted at [phone] Rosalinda, who noted she received a call from him while he was intoxicated when he made the suicidal statement, although she reiterated this patient has no prior history of suicide attempt.

## 2023-04-28 NOTE — ED PROVIDER NOTE - PHYSICAL EXAMINATION
Afebrile, hemodynamically stable, saturating well on room air  NAD, well appearing, sitting comfortably in bed, no WOB, speaking full sentences  Head NCAT  EOMI grossly, anicteric  MMM, no tongue fasciculations  No JVD  RRR, nml S1/S2, no m/r/g  Lungs CTAB, no w/r/r  Abd soft, NT, ND, nml BS, no rebound or guarding  AAO, CN's 3-12 grossly intact  CEBALLOS spontaneously, no leg cyanosis or edema, no tremor  Skin warm, well perfused, no rashes or hives

## 2023-04-28 NOTE — ED BEHAVIORAL HEALTH ASSESSMENT NOTE - OTHER PAST PSYCHIATRIC HISTORY (INCLUDE DETAILS REGARDING ONSET, COURSE OF ILLNESS, INPATIENT/OUTPATIENT TREATMENT)
See summary.  Patient previously completed 28 day rehab at [facility] in [place]. He relapsed about 1.5 months later.   He is currently in therapy treatment with provider Senia t[facility]

## 2023-04-28 NOTE — ED PROVIDER NOTE - CLINICAL SUMMARY MEDICAL DECISION MAKING FREE TEXT BOX
.    Pt w/ reported hx bipolar d/o, etoh misuse, htn p/w etoh intox and SI. All available lab tests, imaging tests, and EKGs independently reviewed and interpreted by me. Pt observed in ED. No acute events. Pt cleared by Psyche. Pt seen by SBIRT. Pt to f/up with detox program arranged by SBIRT and f/up w/ outpt therapist. stable for dc.    .

## 2023-04-28 NOTE — ED PROVIDER NOTE - PROGRESS NOTE DETAILS
Patient with report of suicidal ideation. Patient also currently intoxicated. No evidence of head trauma, fever or systemic symptoms. No evidence of withdrawal at this time. Signed off care to  who will sign off for psychiatry consult in the morning due to reported suicidality. Patient with report of suicidal ideation. Patient also currently intoxicated. Patient with alcohol intoxication. No significant evidence of withdrawal, given Librium due to some anxiety and report of mild withdrawal. No evidence of head trauma, fever or systemic symptoms. No evidence of withdrawal at this time. Signed off care to  who will sign off for psychiatry consult in the morning due to reported suicidality. Patient with report of suicidal ideation. Patient also currently intoxicated. Patient with alcohol intoxication. No significant evidence of withdrawal, given Librium due to some anxiety and report of mild withdrawal. No evidence of head trauma, fever or systemic symptoms. No evidence of withdrawal at this time. Signed off care to Dr. SHELLEY Stevens who will sign off for psychiatry consult in the morning due to reported suicidality. ER: pt received as a signout from Dr. Jones. pt not in any distress. pt pending psych eval. pt signed out to Dr. Pizano

## 2023-04-28 NOTE — ED BEHAVIORAL HEALTH ASSESSMENT NOTE - SUMMARY
58 year old male , domiciled alone with reported history of bipolar disorder, currently in therapy treatment, history of alcohol use and prior inpatient rehab program, who presents to the ER with alcohol intoxication and report of suicidal ideation. BAL on arrival to ED was 256, seen for suicidal ideation.    On evaluation, this patient is noted to have grave concerns about the drinking,  and he is currently motivated  to seek substance use treatment. He is not endorsing any suicidal or homicidal ideation. He admits he is stressed, sad and concerns about the excessive drinking further exacerbated by underlying drinking, but he has no thought of harming himself or others. No psychosis elicited, and there is no manic symptoms. patient is not observed to exhibit withdrawal symptoms. At this time there is no indication for inpatient psychiatry admission, he will strongly benefit from substance use rehab program, which he is motivated to attend.   SBIRT team is contacted and will meet with patient  There is no psychiatric contraindication to discharge this patient.

## 2023-04-28 NOTE — ED PROVIDER NOTE - PATIENT PORTAL LINK FT
You can access the FollowMyHealth Patient Portal offered by [facility] by registering at the following website: [email or web address]. By joining [facility]’s FollowMyHealth portal, you will also be able to view your health information using other applications (apps) compatible with our system.

## 2023-04-28 NOTE — ED BEHAVIORAL HEALTH ASSESSMENT NOTE - DESCRIPTION
See above Twice . Has two young children ages 7 and 5 years. He also has two adults children ages 40 and 38. Patient is retired from his jobs as a , but yet on disability.

## 2023-04-28 NOTE — ED PROVIDER NOTE - OBJECTIVE STATEMENT
58-year-old male with history of HTN, alcohol abuse, presents for suicidal ideation. Patient states that his friend called due to suicidal ideation. Patient states that he felt that he was "close to doing it" tonight. When asked what this means, he states that he was going to knife himself, as he states the liquor store closed and he felt depressed. He also states that he needs help to stop drinking, drinks daily, last drink was approximately 3 hours prior to arrival. Denies drug use. Denies all bodily and physical symptoms.

## 2023-05-18 NOTE — ED ADULT NURSE NOTE - NS ED NOTE ABUSE SUSPICION NEGLECT YN
[FreeTextEntry1] : 53 yo male presenting with moderate to severe right knee oa with medial joint space narrowing and varus alignment\par -Discussed with patient that in the future when the pain worsens he may RTO for temporary relief of pain with CSI\par -Discussed with patient in the future when pain and non-surgical modalities do not provide relief of pain that he will be indicated for TKA, patient expressed understanding\par -Activities as tolerated\par -Rest, ice, compression, elevation, PRN for pain\par -All questions answered\par -F/u PRN\par \par Entered by Alvarado Valdovinos PA-C acting as scribe.\par Dr. Macdonald Attestation\par The documentation recorded by the scribe, in my presence, accurately reflects the service I, Dr. Macdonald, personally performed, and the decisions made by me with my edits as appropriate.\par 
No

## 2023-12-12 ENCOUNTER — INPATIENT (INPATIENT)
Facility: HOSPITAL | Age: 58
LOS: 2 days | Discharge: HOME HEALTH | DRG: 854 | End: 2023-12-15
Attending: INTERNAL MEDICINE | Admitting: INTERNAL MEDICINE
Payer: MEDICARE

## 2023-12-12 VITALS
OXYGEN SATURATION: 95 % | TEMPERATURE: 100 F | RESPIRATION RATE: 18 BRPM | HEART RATE: 113 BPM | DIASTOLIC BLOOD PRESSURE: 87 MMHG | WEIGHT: 139.99 LBS | HEIGHT: 68 IN | SYSTOLIC BLOOD PRESSURE: 125 MMHG

## 2023-12-12 DIAGNOSIS — L02.91 CUTANEOUS ABSCESS, UNSPECIFIED: ICD-10-CM

## 2023-12-12 LAB
ALBUMIN SERPL ELPH-MCNC: 3.9 G/DL — SIGNIFICANT CHANGE UP (ref 3.5–5.2)
ALBUMIN SERPL ELPH-MCNC: 3.9 G/DL — SIGNIFICANT CHANGE UP (ref 3.5–5.2)
ALP SERPL-CCNC: 126 U/L — HIGH (ref 30–115)
ALP SERPL-CCNC: 126 U/L — HIGH (ref 30–115)
ALT FLD-CCNC: 107 U/L — HIGH (ref 0–41)
ALT FLD-CCNC: 107 U/L — HIGH (ref 0–41)
ANION GAP SERPL CALC-SCNC: 8 MMOL/L — SIGNIFICANT CHANGE UP (ref 7–14)
ANION GAP SERPL CALC-SCNC: 8 MMOL/L — SIGNIFICANT CHANGE UP (ref 7–14)
APTT BLD: 33.5 SEC — SIGNIFICANT CHANGE UP (ref 27–39.2)
APTT BLD: 33.5 SEC — SIGNIFICANT CHANGE UP (ref 27–39.2)
AST SERPL-CCNC: 64 U/L — HIGH (ref 0–41)
AST SERPL-CCNC: 64 U/L — HIGH (ref 0–41)
BASOPHILS # BLD AUTO: 0.03 K/UL — SIGNIFICANT CHANGE UP (ref 0–0.2)
BASOPHILS # BLD AUTO: 0.03 K/UL — SIGNIFICANT CHANGE UP (ref 0–0.2)
BASOPHILS NFR BLD AUTO: 0.6 % — SIGNIFICANT CHANGE UP (ref 0–1)
BASOPHILS NFR BLD AUTO: 0.6 % — SIGNIFICANT CHANGE UP (ref 0–1)
BILIRUB SERPL-MCNC: 1.1 MG/DL — SIGNIFICANT CHANGE UP (ref 0.2–1.2)
BILIRUB SERPL-MCNC: 1.1 MG/DL — SIGNIFICANT CHANGE UP (ref 0.2–1.2)
BUN SERPL-MCNC: 16 MG/DL — SIGNIFICANT CHANGE UP (ref 10–20)
BUN SERPL-MCNC: 16 MG/DL — SIGNIFICANT CHANGE UP (ref 10–20)
CALCIUM SERPL-MCNC: 9.1 MG/DL — SIGNIFICANT CHANGE UP (ref 8.4–10.5)
CALCIUM SERPL-MCNC: 9.1 MG/DL — SIGNIFICANT CHANGE UP (ref 8.4–10.5)
CHLORIDE SERPL-SCNC: 97 MMOL/L — LOW (ref 98–110)
CHLORIDE SERPL-SCNC: 97 MMOL/L — LOW (ref 98–110)
CO2 SERPL-SCNC: 32 MMOL/L — SIGNIFICANT CHANGE UP (ref 17–32)
CO2 SERPL-SCNC: 32 MMOL/L — SIGNIFICANT CHANGE UP (ref 17–32)
CREAT SERPL-MCNC: 1.2 MG/DL — SIGNIFICANT CHANGE UP (ref 0.7–1.5)
CREAT SERPL-MCNC: 1.2 MG/DL — SIGNIFICANT CHANGE UP (ref 0.7–1.5)
EGFR: 71 ML/MIN/1.73M2 — SIGNIFICANT CHANGE UP
EGFR: 71 ML/MIN/1.73M2 — SIGNIFICANT CHANGE UP
EOSINOPHIL # BLD AUTO: 0.11 K/UL — SIGNIFICANT CHANGE UP (ref 0–0.7)
EOSINOPHIL # BLD AUTO: 0.11 K/UL — SIGNIFICANT CHANGE UP (ref 0–0.7)
EOSINOPHIL NFR BLD AUTO: 2.2 % — SIGNIFICANT CHANGE UP (ref 0–8)
EOSINOPHIL NFR BLD AUTO: 2.2 % — SIGNIFICANT CHANGE UP (ref 0–8)
GLUCOSE SERPL-MCNC: 107 MG/DL — HIGH (ref 70–99)
GLUCOSE SERPL-MCNC: 107 MG/DL — HIGH (ref 70–99)
HCT VFR BLD CALC: 41.6 % — LOW (ref 42–52)
HCT VFR BLD CALC: 41.6 % — LOW (ref 42–52)
HGB BLD-MCNC: 14.4 G/DL — SIGNIFICANT CHANGE UP (ref 14–18)
HGB BLD-MCNC: 14.4 G/DL — SIGNIFICANT CHANGE UP (ref 14–18)
IMM GRANULOCYTES NFR BLD AUTO: 0.4 % — HIGH (ref 0.1–0.3)
IMM GRANULOCYTES NFR BLD AUTO: 0.4 % — HIGH (ref 0.1–0.3)
INR BLD: 1.29 RATIO — SIGNIFICANT CHANGE UP (ref 0.65–1.3)
INR BLD: 1.29 RATIO — SIGNIFICANT CHANGE UP (ref 0.65–1.3)
LACTATE SERPL-SCNC: 0.8 MMOL/L — SIGNIFICANT CHANGE UP (ref 0.7–2)
LACTATE SERPL-SCNC: 0.8 MMOL/L — SIGNIFICANT CHANGE UP (ref 0.7–2)
LYMPHOCYTES # BLD AUTO: 0.87 K/UL — LOW (ref 1.2–3.4)
LYMPHOCYTES # BLD AUTO: 0.87 K/UL — LOW (ref 1.2–3.4)
LYMPHOCYTES # BLD AUTO: 17.1 % — LOW (ref 20.5–51.1)
LYMPHOCYTES # BLD AUTO: 17.1 % — LOW (ref 20.5–51.1)
MCHC RBC-ENTMCNC: 29 PG — SIGNIFICANT CHANGE UP (ref 27–31)
MCHC RBC-ENTMCNC: 29 PG — SIGNIFICANT CHANGE UP (ref 27–31)
MCHC RBC-ENTMCNC: 34.6 G/DL — SIGNIFICANT CHANGE UP (ref 32–37)
MCHC RBC-ENTMCNC: 34.6 G/DL — SIGNIFICANT CHANGE UP (ref 32–37)
MCV RBC AUTO: 83.9 FL — SIGNIFICANT CHANGE UP (ref 80–94)
MCV RBC AUTO: 83.9 FL — SIGNIFICANT CHANGE UP (ref 80–94)
MONOCYTES # BLD AUTO: 0.85 K/UL — HIGH (ref 0.1–0.6)
MONOCYTES # BLD AUTO: 0.85 K/UL — HIGH (ref 0.1–0.6)
MONOCYTES NFR BLD AUTO: 16.7 % — HIGH (ref 1.7–9.3)
MONOCYTES NFR BLD AUTO: 16.7 % — HIGH (ref 1.7–9.3)
NEUTROPHILS # BLD AUTO: 3.22 K/UL — SIGNIFICANT CHANGE UP (ref 1.4–6.5)
NEUTROPHILS # BLD AUTO: 3.22 K/UL — SIGNIFICANT CHANGE UP (ref 1.4–6.5)
NEUTROPHILS NFR BLD AUTO: 63 % — SIGNIFICANT CHANGE UP (ref 42.2–75.2)
NEUTROPHILS NFR BLD AUTO: 63 % — SIGNIFICANT CHANGE UP (ref 42.2–75.2)
NRBC # BLD: 0 /100 WBCS — SIGNIFICANT CHANGE UP (ref 0–0)
NRBC # BLD: 0 /100 WBCS — SIGNIFICANT CHANGE UP (ref 0–0)
PLATELET # BLD AUTO: 182 K/UL — SIGNIFICANT CHANGE UP (ref 130–400)
PLATELET # BLD AUTO: 182 K/UL — SIGNIFICANT CHANGE UP (ref 130–400)
PMV BLD: 10.1 FL — SIGNIFICANT CHANGE UP (ref 7.4–10.4)
PMV BLD: 10.1 FL — SIGNIFICANT CHANGE UP (ref 7.4–10.4)
POTASSIUM SERPL-MCNC: 4.1 MMOL/L — SIGNIFICANT CHANGE UP (ref 3.5–5)
POTASSIUM SERPL-MCNC: 4.1 MMOL/L — SIGNIFICANT CHANGE UP (ref 3.5–5)
POTASSIUM SERPL-SCNC: 4.1 MMOL/L — SIGNIFICANT CHANGE UP (ref 3.5–5)
POTASSIUM SERPL-SCNC: 4.1 MMOL/L — SIGNIFICANT CHANGE UP (ref 3.5–5)
PROT SERPL-MCNC: 7.2 G/DL — SIGNIFICANT CHANGE UP (ref 6–8)
PROT SERPL-MCNC: 7.2 G/DL — SIGNIFICANT CHANGE UP (ref 6–8)
PROTHROM AB SERPL-ACNC: 14.8 SEC — HIGH (ref 9.95–12.87)
PROTHROM AB SERPL-ACNC: 14.8 SEC — HIGH (ref 9.95–12.87)
RBC # BLD: 4.96 M/UL — SIGNIFICANT CHANGE UP (ref 4.7–6.1)
RBC # BLD: 4.96 M/UL — SIGNIFICANT CHANGE UP (ref 4.7–6.1)
RBC # FLD: 11.6 % — SIGNIFICANT CHANGE UP (ref 11.5–14.5)
RBC # FLD: 11.6 % — SIGNIFICANT CHANGE UP (ref 11.5–14.5)
SODIUM SERPL-SCNC: 137 MMOL/L — SIGNIFICANT CHANGE UP (ref 135–146)
SODIUM SERPL-SCNC: 137 MMOL/L — SIGNIFICANT CHANGE UP (ref 135–146)
WBC # BLD: 5.1 K/UL — SIGNIFICANT CHANGE UP (ref 4.8–10.8)
WBC # BLD: 5.1 K/UL — SIGNIFICANT CHANGE UP (ref 4.8–10.8)
WBC # FLD AUTO: 5.1 K/UL — SIGNIFICANT CHANGE UP (ref 4.8–10.8)
WBC # FLD AUTO: 5.1 K/UL — SIGNIFICANT CHANGE UP (ref 4.8–10.8)

## 2023-12-12 PROCEDURE — 87070 CULTURE OTHR SPECIMN AEROBIC: CPT

## 2023-12-12 PROCEDURE — 80074 ACUTE HEPATITIS PANEL: CPT

## 2023-12-12 PROCEDURE — 87186 SC STD MICRODIL/AGAR DIL: CPT

## 2023-12-12 PROCEDURE — 86140 C-REACTIVE PROTEIN: CPT

## 2023-12-12 PROCEDURE — 83735 ASSAY OF MAGNESIUM: CPT

## 2023-12-12 PROCEDURE — 81001 URINALYSIS AUTO W/SCOPE: CPT

## 2023-12-12 PROCEDURE — 87640 STAPH A DNA AMP PROBE: CPT

## 2023-12-12 PROCEDURE — 93010 ELECTROCARDIOGRAM REPORT: CPT

## 2023-12-12 PROCEDURE — 76705 ECHO EXAM OF ABDOMEN: CPT

## 2023-12-12 PROCEDURE — 87086 URINE CULTURE/COLONY COUNT: CPT

## 2023-12-12 PROCEDURE — 99223 1ST HOSP IP/OBS HIGH 75: CPT

## 2023-12-12 PROCEDURE — 36415 COLL VENOUS BLD VENIPUNCTURE: CPT

## 2023-12-12 PROCEDURE — 87641 MR-STAPH DNA AMP PROBE: CPT

## 2023-12-12 PROCEDURE — 85652 RBC SED RATE AUTOMATED: CPT

## 2023-12-12 PROCEDURE — 80053 COMPREHEN METABOLIC PANEL: CPT

## 2023-12-12 PROCEDURE — 87077 CULTURE AEROBIC IDENTIFY: CPT

## 2023-12-12 PROCEDURE — 71046 X-RAY EXAM CHEST 2 VIEWS: CPT | Mod: 26

## 2023-12-12 PROCEDURE — 99285 EMERGENCY DEPT VISIT HI MDM: CPT

## 2023-12-12 PROCEDURE — 85025 COMPLETE CBC W/AUTO DIFF WBC: CPT

## 2023-12-12 RX ORDER — FUROSEMIDE 40 MG
1 TABLET ORAL
Refills: 0 | DISCHARGE

## 2023-12-12 RX ORDER — SODIUM CHLORIDE 9 MG/ML
1000 INJECTION, SOLUTION INTRAVENOUS ONCE
Refills: 0 | Status: COMPLETED | OUTPATIENT
Start: 2023-12-12 | End: 2023-12-12

## 2023-12-12 RX ORDER — ACETAMINOPHEN 500 MG
650 TABLET ORAL EVERY 6 HOURS
Refills: 0 | Status: DISCONTINUED | OUTPATIENT
Start: 2023-12-12 | End: 2023-12-15

## 2023-12-12 RX ADMIN — Medication 100 MILLIGRAM(S): at 21:53

## 2023-12-12 RX ADMIN — Medication 650 MILLIGRAM(S): at 22:22

## 2023-12-12 RX ADMIN — Medication 100 MILLIGRAM(S): at 13:58

## 2023-12-12 RX ADMIN — SODIUM CHLORIDE 1000 MILLILITER(S): 9 INJECTION, SOLUTION INTRAVENOUS at 10:50

## 2023-12-12 RX ADMIN — Medication 650 MILLIGRAM(S): at 19:48

## 2023-12-12 NOTE — PATIENT PROFILE ADULT - FALL HARM RISK - UNIVERSAL INTERVENTIONS
Bed in lowest position, wheels locked, appropriate side rails in place/Call bell, personal items and telephone in reach/Instruct patient to call for assistance before getting out of bed or chair/Non-slip footwear when patient is out of bed/[place] to call system/Physically safe environment - no spills, clutter or unnecessary equipment/Purposeful Proactive Rounding/Room/bathroom lighting operational, light cord in reach Bed in lowest position, wheels locked, appropriate side rails in place/Call bell, personal items and telephone in reach/Instruct patient to call for assistance before getting out of bed or chair/Non-slip footwear when patient is out of bed/[place] to call system/Physically safe environment - no spills, clutter or unnecessary equipment/Purposeful Proactive Rounding/Room/bathroom lighting operational, light cord in reach

## 2023-12-12 NOTE — ED PROVIDER NOTE - INTERPRETATION
RX Refill Request    Last Visit: 9/16/2021    Last Refill: 10/5/2022    Next physical: 11/22/2022 interpreted by me independently NSR, nl axis, no ST elevations, nl intervals

## 2023-12-12 NOTE — H&P ADULT - NSHPLABSRESULTS_GEN_ALL_CORE
LABS:  cret                        14.4   5.10  )-----------( 182      ( 12 Dec 2023 10:40 )             41.6     12-12    137  |  97<L>  |  16  ----------------------------<  107<H>  4.1   |  32  |  1.2    Ca    9.1      12 Dec 2023 10:40    TPro  7.2  /  Alb  3.9  /  TBili  1.1  /  DBili  x   /  AST  64<H>  /  ALT  107<H>  /  AlkPhos  126<H>  12-12    PT/INR - ( 12 Dec 2023 10:40 )   PT: 14.80 sec;   INR: 1.29 ratio         PTT - ( 12 Dec 2023 10:40 )  PTT:33.5 sec

## 2023-12-12 NOTE — ED PROVIDER NOTE - CLINICAL SUMMARY MEDICAL DECISION MAKING FREE TEXT BOX
Abscess.  Failure of outpatient management.  Bedside ultrasound with cobblestoning.  No evidence of sepsis.  Admit.    The following studies were ordered and independently interpreted by me -Labs, EKG, imaging.  [Appropriate medications for patient's presenting complaints were ordered and effects were reassessed].   Patient's external records note from outpatient provider were reviewed.

## 2023-12-12 NOTE — ED ADULT NURSE NOTE - OBJECTIVE STATEMENT
Pt has an abscess on left scapula. Pt tried to pop it a week ago while in shower, three days ago white pus and drainage began coming out. Pt went to [facility] they have been dressing and cleaning it. They stated he has MRSA infection. Pt A+Ox4 and ambulatory at baseline. Pt has an abscess on left scapula. Pt tried to pop it a week ago while in shower, three days ago white pus and drainage began coming out. [place] [facility] [place] and [place] it. They stated he has MRSA infection. Pt A+Ox4 and ambulatory at baseline.

## 2023-12-12 NOTE — H&P ADULT - ASSESSMENT
57 yo man with prior history of MSSA peritonsillar abscess s/p I&D, Augmentin and steroids presenting for left upper back abscess s/p I&D taht failed to improve with cephalexin.  pus cultures grew MRSA.    #Sepsis  #skin abscess with surrounding cellulitiss  #MRSA Skin abscess  - s/p clindamycin  - s/p LR  - c/w clindmycin  - Tylenol for fever and pain  - consult wound  - contact isolation  - follow up Blood cultures  - TTE if Blood culture positive  - admit to med floor 59 yo man with prior history of MSSA peritonsillar abscess s/p I&D, Augmentin and steroids presenting for left upper back abscess s/p I&D taht failed to improve with cephalexin.  pus cultures grew MRSA.    #Sepsis  #skin abscess with surrounding cellulitiss  #MRSA Skin abscess  - s/p clindamycin  - s/p LR  - c/w clindmycin  - Tylenol for fever and pain  - consult wound  - contact isolation  - follow up Blood cultures  - TTE if Blood culture positive  - admit to med floor    #Mild transaminitis  - denies recent alcohol intake  - follow up cmp  - US abdomen   57 yo man with prior history of MSSA peritonsillar abscess s/p I&D, Augmentin and steroids presenting for left upper back abscess s/p I&D taht failed to improve with cephalexin.  pus cultures grew MRSA.    #Sepsis  #skin abscess with surrounding cellulitiss  #MRSA Skin abscess  - s/p clindamycin  - s/p LR  - c/w clindmycin  - Tylenol for fever and pain  - consult wound  - contact isolation  - follow up Blood cultures  - TTE if Blood culture positive  - admit to med floor    #Mild transaminitis  - denies recent alcohol intake  - follow up cmp  - US abdomen

## 2023-12-12 NOTE — H&P ADULT - ATTENDING COMMENTS
58M Ex-smoker from home presents with Left Upper Back Abscess with wound cxs growing MRSA in Urgent Care where he has an I&D done a few days ago and discharged on Augmentin?. Now in the ER to be treated for MRSA.     Vital Signs Last 24 Hrs  T(C): 37.9 (12 Dec 2023 15:42), Max: 37.9 (12 Dec 2023 15:42)  T(F): 100.2 (12 Dec 2023 15:42), Max: 100.2 (12 Dec 2023 15:42)  HR: 103 (12 Dec 2023 15:42) (103 - 113)  BP: 117/65 (12 Dec 2023 15:42) (117/65 - 125/87)  RR: 19 (12 Dec 2023 15:42) (18 - 19)  SpO2: 96% (12 Dec 2023 15:42) (95% - 96%)    Parameters below as of 12 Dec 2023 15:42  Patient On (Oxygen Delivery Method): room air    GEN: NAD  RESP: CTA B/L  CV: NL S1/2   GI: +BS Soft NT ND  Ext: No e/c/c   BACK: +++ 4/4 inch area of induration with surrounded erythema with draining whitish material suspecious for pus. / tenderness on palpation.                           14.4   5.10  )-----------( 182      ( 12 Dec 2023 10:40 )             41.6   12-12    137  |  97<L>  |  16  ----------------------------<  107<H>  4.1   |  32  |  1.2    Ca    9.1      12 Dec 2023 10:40    TPro  7.2  /  Alb  3.9  /  TBili  1.1  /  DBili  x   /  AST  64<H>  /  ALT  107<H>  /  AlkPhos  126<H>  12-12    IMPRESSION:   Left Upper Back MRSA Infected Abscess with surrounded cellulitis     PLAN:   Clindamycin 800mg IV q6h  Tylenol prn for pain   Check Nares MRSA swab   Burn Consultation   ID Consultation     DVT Proph    Dispo: Acute / Possible d/c 24-48hrs / anticipate 58M Ex-smoker from home presents with Left Upper Back Abscess with wound cxs growing MRSA in Urgent Care where he has an I&D done a few days ago and discharged on Augmentin?. Now in the ER to be treated for MRSA.     Vital Signs Last 24 Hrs  T(C): 37.9 (12 Dec 2023 15:42), Max: 37.9 (12 Dec 2023 15:42)  T(F): 100.2 (12 Dec 2023 15:42), Max: 100.2 (12 Dec 2023 15:42)  HR: 103 (12 Dec 2023 15:42) (103 - 113)  BP: 117/65 (12 Dec 2023 15:42) (117/65 - 125/87)  RR: 19 (12 Dec 2023 15:42) (18 - 19)  SpO2: 96% (12 Dec 2023 15:42) (95% - 96%)    Parameters below as of 12 Dec 2023 15:42  Patient On (Oxygen Delivery Method): room air    GEN: NAD  RESP: CTA B/L  CV: NL S1/2   GI: +BS Soft NT ND  Ext: No e/c/c   BACK: +++ 4/4 inch area of induration with surrounded erythema with draining whitish material suspecious for pus. / tenderness on palpation.                           14.4   5.10  )-----------( 182      ( 12 Dec 2023 10:40 )             41.6   12-12    137  |  97<L>  |  16  ----------------------------<  107<H>  4.1   |  32  |  1.2    Ca    9.1      12 Dec 2023 10:40    TPro  7.2  /  Alb  3.9  /  TBili  1.1  /  DBili  x   /  AST  64<H>  /  ALT  107<H>  /  AlkPhos  126<H>  12-12    IMPRESSION:   Left Upper Back MRSA Infected Abscess with surrounded cellulitis   Transaminitis     PLAN:   Clindamycin 800mg IV q6h  Tylenol prn for pain   Check Nares MRSA swab   Check Blood cxs   Check Hepatitis Panel and RUQ Sonogram and trend LFTs   Burn Consultation (debridement)   ID Consultation     DVT Proph    Dispo: Acute / Possible d/c 24-48hrs / anticipate

## 2023-12-12 NOTE — H&P ADULT - HISTORY OF PRESENT ILLNESS
59 yo man with prior history of alcohol use and ex-smoker (~quit 6 months ago) presenting for treatement of left upper back skin abscess 2/2 MRSA.  patient initially noted bump on his upper back few days prior that started to grow and became tender associated with fevers. He went to urgent care and was diagnosed with skin abcess which was drained and he was discharged on cephalexin. Patient continues to have fever and fatigue. Pus culture grew MRSA and he was referred to ED for IV abx.    [place] noting that patient was admitted in April 2023 for MSSA peritonsillar abscess which was drained 6 months prior and treated with Augmentin and steroids.    In ED, patient was febrile and tachycardic but BP is normal. He was given IV fluids and started on clindamycin.  patient admitted for  sepsis 2/2  MRSA skin abscess.   57 yo man with prior history of alcohol use and ex-smoker (~quit 6 months ago) presenting for treatement of left upper back skin abscess 2/2 MRSA.  patient initially noted bump on his upper back few days prior that started to grow and became tender associated with fevers. He went to urgent care and was diagnosed with skin abcess which was drained and he was discharged on cephalexin. Patient continues to have fever and fatigue. Pus culture grew MRSA and he was referred to ED for IV abx.    [place] noting that patient was admitted in April 2023 for MSSA peritonsillar abscess which was drained 6 months prior and treated with Augmentin and steroids.    In ED, patient was febrile and tachycardic but BP is normal. He was given IV fluids and started on clindamycin.  patient admitted for  sepsis 2/2  MRSA skin abscess.

## 2023-12-12 NOTE — H&P ADULT - NSHPPHYSICALEXAM_GEN_ALL_CORE
T(C): 37.9 (12-12-23 @ 15:42), Max: 37.9 (12-12-23 @ 15:42)  HR: 103 (12-12-23 @ 15:42) (103 - 113)  BP: 117/65 (12-12-23 @ 15:42) (117/65 - 125/87)  RR: 19 (12-12-23 @ 15:42) (18 - 19)  SpO2: 96% (12-12-23 @ 15:42) (95% - 96%)    CONSTITUTIONAL: no apparent distress  EYES: PERRLA and symmetric, EOMI, No conjunctival or scleral injection, non-icteric  ENMT: Oral mucosa with moist membranes. Poor dentition; no pharyngeal injection or exudates  NECK: Supple, symmetric and without tracheal deviation   RESP: No respiratory distress, no use of accessory muscles; CTA b/l, no WRR  CV: RRR, +S1S2, no MRG; no JVD; no peripheral edema  GI: Soft, NT, ND, no rebound, no guarding;   SKIN: 2cm abcess that is draining thick yellow pus with surrounding erythema noted on left upper back at over lower medial aspect of  left scapula

## 2023-12-12 NOTE — ED PROVIDER NOTE - OBJECTIVE STATEMENT
58 y F no reported pmhx presenting for eval of worsening abscess on L upper back. Patient went to urgent care and had wound drained and cultured, started on abx. Wound has since been spreading, and patient got a call back stating his culture grew MRSA so presented to the ED for evaluation. No fevers, chills, chest pain, shortness of breath, nausea, or vomiting.

## 2023-12-12 NOTE — ED PROVIDER NOTE - PHYSICAL EXAMINATION
Vital Signs: I have reviewed the initial vital signs.  Constitutional: well-nourished, appears stated age, no acute distress.  HEENT: Airway patent, moist MM, EOMI,   CV: regular rate, regular rhythm, well-perfused extremities,   Lungs: Clear to ascultation bilaterally, no increased work of breathing.  ABD: Non-tender, Non-distended,   MSK: Neck supple, nontender,   INTEG: Skin warm, 4x6cm area of induration with purulent drainage. POCUS with no deeper abscess noted.   NEURO: A&Ox3, moving all extremities, normal speech

## 2023-12-12 NOTE — ED PROVIDER NOTE - ATTENDING CONTRIBUTION TO CARE
58-year-old male without significant past medical history now with abscess on his left upper back has been treated already with antibiotics failed antibiotics culture growing MRSA.  No fever.    Exam as noted.  Left upper back torso abscess noted draining.  No crepitus.  Mild tenderness.  Lungs clear.

## 2023-12-12 NOTE — ED ADULT NURSE NOTE - NSFALLUNIVINTERV_ED_ALL_ED
Bed/Stretcher in lowest position, wheels locked, appropriate side rails in place/Call bell, personal items and telephone in reach/Instruct patient to call for assistance before getting out of bed/chair/stretcher/Non-slip footwear applied when patient is off stretcher/[place] to call system/Physically safe environment - no spills, clutter or unnecessary equipment/Purposeful proactive rounding/Room/bathroom lighting operational, light cord in reach Bed/Stretcher in lowest position, wheels locked, appropriate side rails in place/Call bell, personal items and telephone in reach/Instruct patient to call for assistance before getting out of bed/chair/stretcher/Non-slip footwear applied when patient is off stretcher/[place] to call system/Physically safe environment - no spills, clutter or unnecessary equipment/Purposeful proactive rounding/Room/bathroom lighting operational, light cord in reach

## 2023-12-13 LAB
ALBUMIN SERPL ELPH-MCNC: 4.1 G/DL — SIGNIFICANT CHANGE UP (ref 3.5–5.2)
ALBUMIN SERPL ELPH-MCNC: 4.1 G/DL — SIGNIFICANT CHANGE UP (ref 3.5–5.2)
ALP SERPL-CCNC: 152 U/L — HIGH (ref 30–115)
ALP SERPL-CCNC: 152 U/L — HIGH (ref 30–115)
ALT FLD-CCNC: 134 U/L — HIGH (ref 0–41)
ALT FLD-CCNC: 134 U/L — HIGH (ref 0–41)
ANION GAP SERPL CALC-SCNC: 12 MMOL/L — SIGNIFICANT CHANGE UP (ref 7–14)
ANION GAP SERPL CALC-SCNC: 12 MMOL/L — SIGNIFICANT CHANGE UP (ref 7–14)
APPEARANCE UR: ABNORMAL
APPEARANCE UR: ABNORMAL
AST SERPL-CCNC: 87 U/L — HIGH (ref 0–41)
AST SERPL-CCNC: 87 U/L — HIGH (ref 0–41)
BACTERIA # UR AUTO: NEGATIVE /HPF — SIGNIFICANT CHANGE UP
BACTERIA # UR AUTO: NEGATIVE /HPF — SIGNIFICANT CHANGE UP
BASOPHILS # BLD AUTO: 0.03 K/UL — SIGNIFICANT CHANGE UP (ref 0–0.2)
BASOPHILS # BLD AUTO: 0.03 K/UL — SIGNIFICANT CHANGE UP (ref 0–0.2)
BASOPHILS NFR BLD AUTO: 0.8 % — SIGNIFICANT CHANGE UP (ref 0–1)
BASOPHILS NFR BLD AUTO: 0.8 % — SIGNIFICANT CHANGE UP (ref 0–1)
BILIRUB SERPL-MCNC: 1.1 MG/DL — SIGNIFICANT CHANGE UP (ref 0.2–1.2)
BILIRUB SERPL-MCNC: 1.1 MG/DL — SIGNIFICANT CHANGE UP (ref 0.2–1.2)
BILIRUB UR-MCNC: ABNORMAL
BILIRUB UR-MCNC: ABNORMAL
BUN SERPL-MCNC: 15 MG/DL — SIGNIFICANT CHANGE UP (ref 10–20)
BUN SERPL-MCNC: 15 MG/DL — SIGNIFICANT CHANGE UP (ref 10–20)
CALCIUM SERPL-MCNC: 9.2 MG/DL — SIGNIFICANT CHANGE UP (ref 8.4–10.5)
CALCIUM SERPL-MCNC: 9.2 MG/DL — SIGNIFICANT CHANGE UP (ref 8.4–10.5)
CAST: 4 /LPF — SIGNIFICANT CHANGE UP (ref 0–4)
CAST: 4 /LPF — SIGNIFICANT CHANGE UP (ref 0–4)
CHLORIDE SERPL-SCNC: 98 MMOL/L — SIGNIFICANT CHANGE UP (ref 98–110)
CHLORIDE SERPL-SCNC: 98 MMOL/L — SIGNIFICANT CHANGE UP (ref 98–110)
CO2 SERPL-SCNC: 27 MMOL/L — SIGNIFICANT CHANGE UP (ref 17–32)
CO2 SERPL-SCNC: 27 MMOL/L — SIGNIFICANT CHANGE UP (ref 17–32)
COLOR SPEC: SIGNIFICANT CHANGE UP
COLOR SPEC: SIGNIFICANT CHANGE UP
CREAT SERPL-MCNC: 0.9 MG/DL — SIGNIFICANT CHANGE UP (ref 0.7–1.5)
CREAT SERPL-MCNC: 0.9 MG/DL — SIGNIFICANT CHANGE UP (ref 0.7–1.5)
DIFF PNL FLD: ABNORMAL
DIFF PNL FLD: ABNORMAL
EGFR: 100 ML/MIN/1.73M2 — SIGNIFICANT CHANGE UP
EGFR: 100 ML/MIN/1.73M2 — SIGNIFICANT CHANGE UP
EOSINOPHIL # BLD AUTO: 0.2 K/UL — SIGNIFICANT CHANGE UP (ref 0–0.7)
EOSINOPHIL # BLD AUTO: 0.2 K/UL — SIGNIFICANT CHANGE UP (ref 0–0.7)
EOSINOPHIL NFR BLD AUTO: 5 % — SIGNIFICANT CHANGE UP (ref 0–8)
EOSINOPHIL NFR BLD AUTO: 5 % — SIGNIFICANT CHANGE UP (ref 0–8)
GLUCOSE SERPL-MCNC: 117 MG/DL — HIGH (ref 70–99)
GLUCOSE SERPL-MCNC: 117 MG/DL — HIGH (ref 70–99)
GLUCOSE UR QL: NEGATIVE MG/DL — SIGNIFICANT CHANGE UP
GLUCOSE UR QL: NEGATIVE MG/DL — SIGNIFICANT CHANGE UP
HCT VFR BLD CALC: 41.1 % — LOW (ref 42–52)
HCT VFR BLD CALC: 41.1 % — LOW (ref 42–52)
HGB BLD-MCNC: 14.1 G/DL — SIGNIFICANT CHANGE UP (ref 14–18)
HGB BLD-MCNC: 14.1 G/DL — SIGNIFICANT CHANGE UP (ref 14–18)
IMM GRANULOCYTES NFR BLD AUTO: 0.3 % — SIGNIFICANT CHANGE UP (ref 0.1–0.3)
IMM GRANULOCYTES NFR BLD AUTO: 0.3 % — SIGNIFICANT CHANGE UP (ref 0.1–0.3)
KETONES UR-MCNC: ABNORMAL MG/DL
KETONES UR-MCNC: ABNORMAL MG/DL
LEUKOCYTE ESTERASE UR-ACNC: ABNORMAL
LEUKOCYTE ESTERASE UR-ACNC: ABNORMAL
LYMPHOCYTES # BLD AUTO: 0.83 K/UL — LOW (ref 1.2–3.4)
LYMPHOCYTES # BLD AUTO: 0.83 K/UL — LOW (ref 1.2–3.4)
LYMPHOCYTES # BLD AUTO: 20.9 % — SIGNIFICANT CHANGE UP (ref 20.5–51.1)
LYMPHOCYTES # BLD AUTO: 20.9 % — SIGNIFICANT CHANGE UP (ref 20.5–51.1)
MAGNESIUM SERPL-MCNC: 1.9 MG/DL — SIGNIFICANT CHANGE UP (ref 1.8–2.4)
MAGNESIUM SERPL-MCNC: 1.9 MG/DL — SIGNIFICANT CHANGE UP (ref 1.8–2.4)
MCHC RBC-ENTMCNC: 29 PG — SIGNIFICANT CHANGE UP (ref 27–31)
MCHC RBC-ENTMCNC: 29 PG — SIGNIFICANT CHANGE UP (ref 27–31)
MCHC RBC-ENTMCNC: 34.3 G/DL — SIGNIFICANT CHANGE UP (ref 32–37)
MCHC RBC-ENTMCNC: 34.3 G/DL — SIGNIFICANT CHANGE UP (ref 32–37)
MCV RBC AUTO: 84.4 FL — SIGNIFICANT CHANGE UP (ref 80–94)
MCV RBC AUTO: 84.4 FL — SIGNIFICANT CHANGE UP (ref 80–94)
MONOCYTES # BLD AUTO: 0.7 K/UL — HIGH (ref 0.1–0.6)
MONOCYTES # BLD AUTO: 0.7 K/UL — HIGH (ref 0.1–0.6)
MONOCYTES NFR BLD AUTO: 17.6 % — HIGH (ref 1.7–9.3)
MONOCYTES NFR BLD AUTO: 17.6 % — HIGH (ref 1.7–9.3)
NEUTROPHILS # BLD AUTO: 2.2 K/UL — SIGNIFICANT CHANGE UP (ref 1.4–6.5)
NEUTROPHILS # BLD AUTO: 2.2 K/UL — SIGNIFICANT CHANGE UP (ref 1.4–6.5)
NEUTROPHILS NFR BLD AUTO: 55.4 % — SIGNIFICANT CHANGE UP (ref 42.2–75.2)
NEUTROPHILS NFR BLD AUTO: 55.4 % — SIGNIFICANT CHANGE UP (ref 42.2–75.2)
NITRITE UR-MCNC: NEGATIVE — SIGNIFICANT CHANGE UP
NITRITE UR-MCNC: NEGATIVE — SIGNIFICANT CHANGE UP
NRBC # BLD: 0 /100 WBCS — SIGNIFICANT CHANGE UP (ref 0–0)
NRBC # BLD: 0 /100 WBCS — SIGNIFICANT CHANGE UP (ref 0–0)
PH UR: 6 — SIGNIFICANT CHANGE UP (ref 5–8)
PH UR: 6 — SIGNIFICANT CHANGE UP (ref 5–8)
PLATELET # BLD AUTO: 188 K/UL — SIGNIFICANT CHANGE UP (ref 130–400)
PLATELET # BLD AUTO: 188 K/UL — SIGNIFICANT CHANGE UP (ref 130–400)
PMV BLD: 10.3 FL — SIGNIFICANT CHANGE UP (ref 7.4–10.4)
PMV BLD: 10.3 FL — SIGNIFICANT CHANGE UP (ref 7.4–10.4)
POTASSIUM SERPL-MCNC: 3.9 MMOL/L — SIGNIFICANT CHANGE UP (ref 3.5–5)
POTASSIUM SERPL-MCNC: 3.9 MMOL/L — SIGNIFICANT CHANGE UP (ref 3.5–5)
POTASSIUM SERPL-SCNC: 3.9 MMOL/L — SIGNIFICANT CHANGE UP (ref 3.5–5)
POTASSIUM SERPL-SCNC: 3.9 MMOL/L — SIGNIFICANT CHANGE UP (ref 3.5–5)
PROT SERPL-MCNC: 6.7 G/DL — SIGNIFICANT CHANGE UP (ref 6–8)
PROT SERPL-MCNC: 6.7 G/DL — SIGNIFICANT CHANGE UP (ref 6–8)
PROT UR-MCNC: SIGNIFICANT CHANGE UP MG/DL
PROT UR-MCNC: SIGNIFICANT CHANGE UP MG/DL
RBC # BLD: 4.87 M/UL — SIGNIFICANT CHANGE UP (ref 4.7–6.1)
RBC # BLD: 4.87 M/UL — SIGNIFICANT CHANGE UP (ref 4.7–6.1)
RBC # FLD: 11.6 % — SIGNIFICANT CHANGE UP (ref 11.5–14.5)
RBC # FLD: 11.6 % — SIGNIFICANT CHANGE UP (ref 11.5–14.5)
RBC CASTS # UR COMP ASSIST: 2 /HPF — SIGNIFICANT CHANGE UP (ref 0–4)
RBC CASTS # UR COMP ASSIST: 2 /HPF — SIGNIFICANT CHANGE UP (ref 0–4)
SODIUM SERPL-SCNC: 137 MMOL/L — SIGNIFICANT CHANGE UP (ref 135–146)
SODIUM SERPL-SCNC: 137 MMOL/L — SIGNIFICANT CHANGE UP (ref 135–146)
SP GR SPEC: 1.02 — SIGNIFICANT CHANGE UP (ref 1–1.03)
SP GR SPEC: 1.02 — SIGNIFICANT CHANGE UP (ref 1–1.03)
SQUAMOUS # UR AUTO: 3 /HPF — SIGNIFICANT CHANGE UP (ref 0–5)
SQUAMOUS # UR AUTO: 3 /HPF — SIGNIFICANT CHANGE UP (ref 0–5)
UROBILINOGEN FLD QL: 4 MG/DL (ref 0.2–1)
UROBILINOGEN FLD QL: 4 MG/DL (ref 0.2–1)
WBC # BLD: 3.97 K/UL — LOW (ref 4.8–10.8)
WBC # BLD: 3.97 K/UL — LOW (ref 4.8–10.8)
WBC # FLD AUTO: 3.97 K/UL — LOW (ref 4.8–10.8)
WBC # FLD AUTO: 3.97 K/UL — LOW (ref 4.8–10.8)
WBC UR QL: 5 /HPF — SIGNIFICANT CHANGE UP (ref 0–5)
WBC UR QL: 5 /HPF — SIGNIFICANT CHANGE UP (ref 0–5)

## 2023-12-13 PROCEDURE — 99233 SBSQ HOSP IP/OBS HIGH 50: CPT

## 2023-12-13 PROCEDURE — 76705 ECHO EXAM OF ABDOMEN: CPT | Mod: 26

## 2023-12-13 RX ORDER — ONDANSETRON 8 MG/1
4 TABLET, FILM COATED ORAL ONCE
Refills: 0 | Status: COMPLETED | OUTPATIENT
Start: 2023-12-13 | End: 2023-12-13

## 2023-12-13 RX ADMIN — Medication 100 MILLIGRAM(S): at 21:03

## 2023-12-13 RX ADMIN — ONDANSETRON 4 MILLIGRAM(S): 8 TABLET, FILM COATED ORAL at 05:40

## 2023-12-13 RX ADMIN — Medication 100 MILLIGRAM(S): at 12:53

## 2023-12-13 RX ADMIN — Medication 650 MILLIGRAM(S): at 05:40

## 2023-12-13 RX ADMIN — Medication 100 MILLIGRAM(S): at 05:28

## 2023-12-13 NOTE — PROGRESS NOTE ADULT - ASSESSMENT
a/p:  #Sepsis 2/2 Left upper back Skin Abscess 2/2  MRSA   -continue local wound care  -burn consult---may need repeat intervention (had i/d performed at  with outpatietn cx mrsa)  -monitor wbc and fever curve  -continue iv clinda   -check ESR/CRP/Procal  -pain control  -denies any h/o IVDA or similar prior skin infection---he did have MSSA peritonsillar abscess 8 months ago  -check mrsa nares  -consider ID consult if any clinical change or symptoms worsening    #Transaminitis (ALT>AST)  #h/o prior ETOH use (reports no etoh >200 days)  -monitor lft  -AbdUS -+ fatty liver change, cholelithiasis without obstruction  -check hep serology  -cotninue mvi, folate, thiamine  -outpaitent counseling for continued ETOH cessation (no need for inpatient catch team)  -hold hepatoxic meds    DVT/GI ppx  guarded prognosis    FULL CODE    anticipate dc home in 24-48 hrs if continues to improve clinically, pending cultures and burn eval    Total time spent to complete patient's bedside assessment, review medical chart, discuss medical plan of care with covering medical team was more than 50 minutes  with >50% of time spendt face to face with patient, discussion with patient/family and/or coordination of care

## 2023-12-13 NOTE — PROGRESS NOTE ADULT - SUBJECTIVE AND OBJECTIVE BOX
Patient is a 58y old  Male who presents with a chief complaint of left upper back skin abscess MRSA (12 Dec 2023 17:38)    HPI:  57 yo man with prior history of alcohol use and ex-smoker (~quit 6 months ago) presenting for treatement of left upper back skin abscess 2/2 MRSA.  patient initially noted bump on his upper back few days prior that started to grow and became tender associated with fevers. He went to urgent care and was diagnosed with skin abcess which was drained and he was discharged on cephalexin. Patient continues to have fever and fatigue. Pus culture grew MRSA and he was referred to ED for IV abx.    [place] noting that patient was admitted in April 2023 for MSSA peritonsillar abscess which was drained 6 months prior and treated with Augmentin and steroids.    In ED, patient was febrile and tachycardic but BP is normal. He was given IV fluids and started on clindamycin.  patient admitted for  sepsis 2/2  MRSA skin abscess.   (12 Dec 2023 17:38)    patient seen and examined independently on morning rounds for the first time today, chart reviewed and discussed with the medicine resident and on interdisciplinary rounds.    hospital day #1    no overnight events---c/o pain surrounding Left back dressing    Vital Signs Last 24 Hrs  T(C): 36.9 (13 Dec 2023 15:46), Max: 36.9 (13 Dec 2023 15:46)  T(F): 98.5 (13 Dec 2023 15:46), Max: 98.5 (13 Dec 2023 15:46)  HR: 80 (13 Dec 2023 15:46) (65 - 80)  BP: 102/62 (13 Dec 2023 15:46) (102/61 - 105/70)  BP(mean): 78 (13 Dec 2023 15:46) (78 - 78)  RR: 19 (13 Dec 2023 15:46) (19 - 19)  SpO2: 95% (13 Dec 2023 15:46) (95% - 97%)    Parameters below as of 13 Dec 2023 15:46  Patient On (Oxygen Delivery Method): room air    PE:  GEN-NAD, AAOx3  SKIN: left 4x4 round raised lesion with surrounding erythema (slightly receding from marks put at UC) and central white pus/drainage  PULM- ctab fair air entry  CVS- +s1/s2 RRR   GI- soft NT ND +bs, no rebound, no guarding  EXT- no edema                          14.1   3.97  )-----------( 188      ( 13 Dec 2023 08:13 )             41.1     12-13    137  |  98  |  15  ----------------------------<  117<H>  3.9   |  27  |  0.9    Ca    9.2      13 Dec 2023 08:13  Mg     1.9     12-13    TPro  6.7  /  Alb  4.1  /  TBili  1.1  /  DBili  x   /  AST  87<H>  /  ALT  134<H>  /  AlkPhos  152<H>  12-13        Urinalysis Basic - ( 13 Dec 2023 08:13 )    Color: x / Appearance: x / SG: x / pH: x  Gluc: 117 mg/dL / Ketone: x  / Bili: x / Urobili: x   Blood: x / Protein: x / Nitrite: x   Leuk Esterase: x / RBC: x / WBC x   Sq Epi: x / Non Sq Epi: x / Bacteria: x          MEDICATIONS  (STANDING):  clindamycin IVPB 900 milliGRAM(s) IV Intermittent every 8 hours     Patient is a 58y old  Male who presents with a chief complaint of left upper back skin abscess MRSA (12 Dec 2023 17:38)    HPI:  59 yo man with prior history of alcohol use and ex-smoker (~quit 6 months ago) presenting for treatement of left upper back skin abscess 2/2 MRSA.  patient initially noted bump on his upper back few days prior that started to grow and became tender associated with fevers. He went to urgent care and was diagnosed with skin abcess which was drained and he was discharged on cephalexin. Patient continues to have fever and fatigue. Pus culture grew MRSA and he was referred to ED for IV abx.    [place] noting that patient was admitted in April 2023 for MSSA peritonsillar abscess which was drained 6 months prior and treated with Augmentin and steroids.    In ED, patient was febrile and tachycardic but BP is normal. He was given IV fluids and started on clindamycin.  patient admitted for  sepsis 2/2  MRSA skin abscess.   (12 Dec 2023 17:38)    patient seen and examined independently on morning rounds for the first time today, chart reviewed and discussed with the medicine resident and on interdisciplinary rounds.    hospital day #1    no overnight events---c/o pain surrounding Left back dressing    Vital Signs Last 24 Hrs  T(C): 36.9 (13 Dec 2023 15:46), Max: 36.9 (13 Dec 2023 15:46)  T(F): 98.5 (13 Dec 2023 15:46), Max: 98.5 (13 Dec 2023 15:46)  HR: 80 (13 Dec 2023 15:46) (65 - 80)  BP: 102/62 (13 Dec 2023 15:46) (102/61 - 105/70)  BP(mean): 78 (13 Dec 2023 15:46) (78 - 78)  RR: 19 (13 Dec 2023 15:46) (19 - 19)  SpO2: 95% (13 Dec 2023 15:46) (95% - 97%)    Parameters below as of 13 Dec 2023 15:46  Patient On (Oxygen Delivery Method): room air    PE:  GEN-NAD, AAOx3  SKIN: left 4x4 round raised lesion with surrounding erythema (slightly receding from marks put at UC) and central white pus/drainage  PULM- ctab fair air entry  CVS- +s1/s2 RRR   GI- soft NT ND +bs, no rebound, no guarding  EXT- no edema                          14.1   3.97  )-----------( 188      ( 13 Dec 2023 08:13 )             41.1     12-13    137  |  98  |  15  ----------------------------<  117<H>  3.9   |  27  |  0.9    Ca    9.2      13 Dec 2023 08:13  Mg     1.9     12-13    TPro  6.7  /  Alb  4.1  /  TBili  1.1  /  DBili  x   /  AST  87<H>  /  ALT  134<H>  /  AlkPhos  152<H>  12-13        Urinalysis Basic - ( 13 Dec 2023 08:13 )    Color: x / Appearance: x / SG: x / pH: x  Gluc: 117 mg/dL / Ketone: x  / Bili: x / Urobili: x   Blood: x / Protein: x / Nitrite: x   Leuk Esterase: x / RBC: x / WBC x   Sq Epi: x / Non Sq Epi: x / Bacteria: x          MEDICATIONS  (STANDING):  clindamycin IVPB 900 milliGRAM(s) IV Intermittent every 8 hours

## 2023-12-14 ENCOUNTER — TRANSCRIPTION ENCOUNTER (OUTPATIENT)
Age: 58
End: 2023-12-14

## 2023-12-14 LAB
ALBUMIN SERPL ELPH-MCNC: 3.8 G/DL — SIGNIFICANT CHANGE UP (ref 3.5–5.2)
ALBUMIN SERPL ELPH-MCNC: 3.8 G/DL — SIGNIFICANT CHANGE UP (ref 3.5–5.2)
ALP SERPL-CCNC: 135 U/L — HIGH (ref 30–115)
ALP SERPL-CCNC: 135 U/L — HIGH (ref 30–115)
ALT FLD-CCNC: 88 U/L — HIGH (ref 0–41)
ALT FLD-CCNC: 88 U/L — HIGH (ref 0–41)
ANION GAP SERPL CALC-SCNC: 10 MMOL/L — SIGNIFICANT CHANGE UP (ref 7–14)
ANION GAP SERPL CALC-SCNC: 10 MMOL/L — SIGNIFICANT CHANGE UP (ref 7–14)
AST SERPL-CCNC: 27 U/L — SIGNIFICANT CHANGE UP (ref 0–41)
AST SERPL-CCNC: 27 U/L — SIGNIFICANT CHANGE UP (ref 0–41)
BASOPHILS # BLD AUTO: 0.03 K/UL — SIGNIFICANT CHANGE UP (ref 0–0.2)
BASOPHILS # BLD AUTO: 0.03 K/UL — SIGNIFICANT CHANGE UP (ref 0–0.2)
BASOPHILS NFR BLD AUTO: 0.9 % — SIGNIFICANT CHANGE UP (ref 0–1)
BASOPHILS NFR BLD AUTO: 0.9 % — SIGNIFICANT CHANGE UP (ref 0–1)
BILIRUB SERPL-MCNC: 0.4 MG/DL — SIGNIFICANT CHANGE UP (ref 0.2–1.2)
BILIRUB SERPL-MCNC: 0.4 MG/DL — SIGNIFICANT CHANGE UP (ref 0.2–1.2)
BUN SERPL-MCNC: 13 MG/DL — SIGNIFICANT CHANGE UP (ref 10–20)
BUN SERPL-MCNC: 13 MG/DL — SIGNIFICANT CHANGE UP (ref 10–20)
CALCIUM SERPL-MCNC: 9.1 MG/DL — SIGNIFICANT CHANGE UP (ref 8.4–10.5)
CALCIUM SERPL-MCNC: 9.1 MG/DL — SIGNIFICANT CHANGE UP (ref 8.4–10.5)
CHLORIDE SERPL-SCNC: 100 MMOL/L — SIGNIFICANT CHANGE UP (ref 98–110)
CHLORIDE SERPL-SCNC: 100 MMOL/L — SIGNIFICANT CHANGE UP (ref 98–110)
CO2 SERPL-SCNC: 29 MMOL/L — SIGNIFICANT CHANGE UP (ref 17–32)
CO2 SERPL-SCNC: 29 MMOL/L — SIGNIFICANT CHANGE UP (ref 17–32)
CREAT SERPL-MCNC: 1 MG/DL — SIGNIFICANT CHANGE UP (ref 0.7–1.5)
CREAT SERPL-MCNC: 1 MG/DL — SIGNIFICANT CHANGE UP (ref 0.7–1.5)
CRP SERPL-MCNC: 59.4 MG/L — HIGH
CRP SERPL-MCNC: 59.4 MG/L — HIGH
CULTURE RESULTS: SIGNIFICANT CHANGE UP
CULTURE RESULTS: SIGNIFICANT CHANGE UP
EGFR: 88 ML/MIN/1.73M2 — SIGNIFICANT CHANGE UP
EGFR: 88 ML/MIN/1.73M2 — SIGNIFICANT CHANGE UP
EOSINOPHIL # BLD AUTO: 0.32 K/UL — SIGNIFICANT CHANGE UP (ref 0–0.7)
EOSINOPHIL # BLD AUTO: 0.32 K/UL — SIGNIFICANT CHANGE UP (ref 0–0.7)
EOSINOPHIL NFR BLD AUTO: 9.7 % — HIGH (ref 0–8)
EOSINOPHIL NFR BLD AUTO: 9.7 % — HIGH (ref 0–8)
ERYTHROCYTE [SEDIMENTATION RATE] IN BLOOD: 58 MM/HR — HIGH (ref 0–10)
ERYTHROCYTE [SEDIMENTATION RATE] IN BLOOD: 58 MM/HR — HIGH (ref 0–10)
GLUCOSE SERPL-MCNC: 114 MG/DL — HIGH (ref 70–99)
GLUCOSE SERPL-MCNC: 114 MG/DL — HIGH (ref 70–99)
HAV IGM SER-ACNC: SIGNIFICANT CHANGE UP
HAV IGM SER-ACNC: SIGNIFICANT CHANGE UP
HBV CORE IGM SER-ACNC: SIGNIFICANT CHANGE UP
HBV CORE IGM SER-ACNC: SIGNIFICANT CHANGE UP
HBV SURFACE AG SER-ACNC: SIGNIFICANT CHANGE UP
HBV SURFACE AG SER-ACNC: SIGNIFICANT CHANGE UP
HCT VFR BLD CALC: 38 % — LOW (ref 42–52)
HCT VFR BLD CALC: 38 % — LOW (ref 42–52)
HCV AB S/CO SERPL IA: 0.07 S/CO — SIGNIFICANT CHANGE UP (ref 0–0.99)
HCV AB S/CO SERPL IA: 0.07 S/CO — SIGNIFICANT CHANGE UP (ref 0–0.99)
HCV AB SERPL-IMP: SIGNIFICANT CHANGE UP
HCV AB SERPL-IMP: SIGNIFICANT CHANGE UP
HGB BLD-MCNC: 13.1 G/DL — LOW (ref 14–18)
HGB BLD-MCNC: 13.1 G/DL — LOW (ref 14–18)
LYMPHOCYTES # BLD AUTO: 1.14 K/UL — LOW (ref 1.2–3.4)
LYMPHOCYTES # BLD AUTO: 1.14 K/UL — LOW (ref 1.2–3.4)
LYMPHOCYTES # BLD AUTO: 34.5 % — SIGNIFICANT CHANGE UP (ref 20.5–51.1)
LYMPHOCYTES # BLD AUTO: 34.5 % — SIGNIFICANT CHANGE UP (ref 20.5–51.1)
MAGNESIUM SERPL-MCNC: 1.8 MG/DL — SIGNIFICANT CHANGE UP (ref 1.8–2.4)
MAGNESIUM SERPL-MCNC: 1.8 MG/DL — SIGNIFICANT CHANGE UP (ref 1.8–2.4)
MCHC RBC-ENTMCNC: 28.3 PG — SIGNIFICANT CHANGE UP (ref 27–31)
MCHC RBC-ENTMCNC: 28.3 PG — SIGNIFICANT CHANGE UP (ref 27–31)
MCHC RBC-ENTMCNC: 34.5 G/DL — SIGNIFICANT CHANGE UP (ref 32–37)
MCHC RBC-ENTMCNC: 34.5 G/DL — SIGNIFICANT CHANGE UP (ref 32–37)
MCV RBC AUTO: 82.1 FL — SIGNIFICANT CHANGE UP (ref 80–94)
MCV RBC AUTO: 82.1 FL — SIGNIFICANT CHANGE UP (ref 80–94)
MONOCYTES # BLD AUTO: 0.38 K/UL — SIGNIFICANT CHANGE UP (ref 0.1–0.6)
MONOCYTES # BLD AUTO: 0.38 K/UL — SIGNIFICANT CHANGE UP (ref 0.1–0.6)
MONOCYTES NFR BLD AUTO: 11.5 % — HIGH (ref 1.7–9.3)
MONOCYTES NFR BLD AUTO: 11.5 % — HIGH (ref 1.7–9.3)
MRSA PCR RESULT.: NEGATIVE — SIGNIFICANT CHANGE UP
MRSA PCR RESULT.: NEGATIVE — SIGNIFICANT CHANGE UP
NEUTROPHILS # BLD AUTO: 1.08 K/UL — LOW (ref 1.4–6.5)
NEUTROPHILS # BLD AUTO: 1.08 K/UL — LOW (ref 1.4–6.5)
NEUTROPHILS NFR BLD AUTO: 32.8 % — LOW (ref 42.2–75.2)
NEUTROPHILS NFR BLD AUTO: 32.8 % — LOW (ref 42.2–75.2)
PLATELET # BLD AUTO: 240 K/UL — SIGNIFICANT CHANGE UP (ref 130–400)
PLATELET # BLD AUTO: 240 K/UL — SIGNIFICANT CHANGE UP (ref 130–400)
PMV BLD: 10.2 FL — SIGNIFICANT CHANGE UP (ref 7.4–10.4)
PMV BLD: 10.2 FL — SIGNIFICANT CHANGE UP (ref 7.4–10.4)
POTASSIUM SERPL-MCNC: 3.6 MMOL/L — SIGNIFICANT CHANGE UP (ref 3.5–5)
POTASSIUM SERPL-MCNC: 3.6 MMOL/L — SIGNIFICANT CHANGE UP (ref 3.5–5)
POTASSIUM SERPL-SCNC: 3.6 MMOL/L — SIGNIFICANT CHANGE UP (ref 3.5–5)
POTASSIUM SERPL-SCNC: 3.6 MMOL/L — SIGNIFICANT CHANGE UP (ref 3.5–5)
PROT SERPL-MCNC: 6.5 G/DL — SIGNIFICANT CHANGE UP (ref 6–8)
PROT SERPL-MCNC: 6.5 G/DL — SIGNIFICANT CHANGE UP (ref 6–8)
RBC # BLD: 4.63 M/UL — LOW (ref 4.7–6.1)
RBC # BLD: 4.63 M/UL — LOW (ref 4.7–6.1)
RBC # FLD: 11.6 % — SIGNIFICANT CHANGE UP (ref 11.5–14.5)
RBC # FLD: 11.6 % — SIGNIFICANT CHANGE UP (ref 11.5–14.5)
SODIUM SERPL-SCNC: 139 MMOL/L — SIGNIFICANT CHANGE UP (ref 135–146)
SODIUM SERPL-SCNC: 139 MMOL/L — SIGNIFICANT CHANGE UP (ref 135–146)
SPECIMEN SOURCE: SIGNIFICANT CHANGE UP
SPECIMEN SOURCE: SIGNIFICANT CHANGE UP
WBC # BLD: 3.3 K/UL — LOW (ref 4.8–10.8)
WBC # BLD: 3.3 K/UL — LOW (ref 4.8–10.8)
WBC # FLD AUTO: 3.3 K/UL — LOW (ref 4.8–10.8)
WBC # FLD AUTO: 3.3 K/UL — LOW (ref 4.8–10.8)

## 2023-12-14 PROCEDURE — 99232 SBSQ HOSP IP/OBS MODERATE 35: CPT

## 2023-12-14 PROCEDURE — 11043 DBRDMT MUSC&/FSCA 1ST 20/<: CPT

## 2023-12-14 RX ORDER — LIDOCAINE HYDROCHLORIDE AND EPINEPHRINE 10; 10 MG/ML; UG/ML
40 INJECTION, SOLUTION INFILTRATION; PERINEURAL ONCE
Refills: 0 | Status: DISCONTINUED | OUTPATIENT
Start: 2023-12-14 | End: 2023-12-15

## 2023-12-14 RX ORDER — FAMOTIDINE 10 MG/ML
20 INJECTION INTRAVENOUS DAILY
Refills: 0 | Status: DISCONTINUED | OUTPATIENT
Start: 2023-12-14 | End: 2023-12-15

## 2023-12-14 RX ORDER — HYDROMORPHONE HYDROCHLORIDE 2 MG/ML
0.5 INJECTION INTRAMUSCULAR; INTRAVENOUS; SUBCUTANEOUS ONCE
Refills: 0 | Status: DISCONTINUED | OUTPATIENT
Start: 2023-12-14 | End: 2023-12-14

## 2023-12-14 RX ADMIN — FAMOTIDINE 20 MILLIGRAM(S): 10 INJECTION INTRAVENOUS at 13:27

## 2023-12-14 RX ADMIN — Medication 100 MILLIGRAM(S): at 05:02

## 2023-12-14 RX ADMIN — Medication 100 MILLIGRAM(S): at 15:00

## 2023-12-14 RX ADMIN — HYDROMORPHONE HYDROCHLORIDE 0.5 MILLIGRAM(S): 2 INJECTION INTRAMUSCULAR; INTRAVENOUS; SUBCUTANEOUS at 10:54

## 2023-12-14 RX ADMIN — Medication 100 MILLIGRAM(S): at 21:09

## 2023-12-14 NOTE — CONSULT NOTE ADULT - NS ATTEND AMEND GEN_ALL_CORE FT
As above left back abscess - history reviewed with patient-   initially treated at urgent care center   presenting with lack of resolution,  and skin necrosis,  pain  and reported fever     left upper back central  purulent necrotic skin   surrounding slight fluctuance multiple pustules/sinus tracts     drainage and debridement with patient consent signed   see procedure note   will require continued packing- VNS support a s needed;  outpt f/u   abx as directed by cx per  primary team

## 2023-12-14 NOTE — PROGRESS NOTE ADULT - ASSESSMENT
a/p:  #Sepsis 2/2 Left upper back Skin Abscess   #MRSA on prior outpatient culture   #h/o MSSA peritonsillar abscess  -continue local wound care  -burn following----waiting for repeat I/D today  -pain control  -will likely need home svcs when ready for dc as patient lives alone and would be unable to change dressing/packing if placed  -monitor wbc and fever curve  -continue iv clinda   -crp and esr both elevated  -f/u procal  -repeat cbc in am (? lymphocytosis)  -check HIV status 2/2 recurrent abscess infection  -pain control  -denies any h/o IVDA or similar prior skin infection---had MSSA peritonsillar abscess 8 months ago  -check mrsa nares  -ID consult if any clinical change or symptoms worsen    #Transaminitis (ALT>AST)  #h/o prior ETOH use (reports no etoh >200 days)  -monitor lft--improving  -AbdUS -+ fatty liver change, cholelithiasis without obstruction  -hep serology negative  -cotninue mvi, folate, thiamine  -outpaitent counseling for continued ETOH cessation (no need for inpatient catch team)  -hold hepatoxic meds    DVT/GI ppx  guarded prognosis    FULL CODE    anticipate dc home in 24-48 hrs if continues to improve clinically, pending cultures and pain control---will need [facility] to f/u with patient at home for wound care and possible dressing change/packing (patient lives alone)      pcp- Dr. Hyatt     Total time spent to complete patient's bedside assessment, review medical chart, discuss medical plan of care with covering medical team was more than 50 minutes  with >50% of time spendt face to face with patient, discussion with patient/family and/or coordination of care

## 2023-12-14 NOTE — PROCEDURE NOTE - ADDITIONAL PROCEDURE DETAILS
multiloculated abscess of back extending into fascia with purulent necrotic skin subcutis and fascia and multiple draining pustules/ sinus tracks

## 2023-12-14 NOTE — DISCHARGE NOTE PROVIDER - HOSPITAL COURSE
57 yo man with prior history of alcohol use and ex-smoker (~quit 6 months ago) presenting for treatement of left upper back skin abscess 2/2 MRSA.  patient initially noted bump on his upper back few days prior that started to grow and became tender associated with fevers. He went to urgent care and was diagnosed with skin abcess which was drained and he was discharged on cephalexin. Patient continues to have fever and fatigue. Pus culture grew MRSA and he was referred to ED for IV abx.    [place] noting that patient was admitted in April 2023 for MSSA peritonsillar abscess which was drained 6 months prior and treated with Augmentin and steroids.    In ED, patient was febrile and tachycardic but BP is normal. He was given IV fluids and started on clindamycin.  patient admitted for  sepsis 2/2  MRSA skin abscess.    #Sepsis 2/2 Left upper back Skin Abscess 2/2  MRSA   -continue local wound care  -burn consult---may need repeat intervention (had i/d performed at  with outpatietn cx mrsa)  -monitor wbc and fever curve  -continue iv clinda   -check ESR/CRP/Procal  -pain control  -denies any h/o IVDA or similar prior skin infection---he did have MSSA peritonsillar abscess 8 months ago  -check mrsa nares  -consider ID consult if any clinical change or symptoms worsening    #Transaminitis (ALT>AST)  #h/o prior ETOH use (reports no etoh >200 days)  -monitor lft  -AbdUS -+ fatty liver change, cholelithiasis without obstruction  -check hep serology  -cotninue mvi, folate, thiamine  -outpaitent counseling for continued ETOH cessation (no need for inpatient catch team)  -hold hepatoxic meds           57 yo man with prior history of alcohol use and ex-smoker (~quit 6 months ago) presenting for treatement of left upper back skin abscess 2/2 MRSA.  patient initially noted bump on his upper back few days prior that started to grow and became tender associated with fevers. He went to urgent care and was diagnosed with skin abcess which was drained and he was discharged on cephalexin. Patient continues to have fever and fatigue. Pus culture grew MRSA and he was referred to ED for IV abx.    [place] noting that patient was admitted in April 2023 for MSSA peritonsillar abscess which was drained 6 months prior and treated with Augmentin and steroids.    In ED, patient was febrile and tachycardic but BP is normal. He was given IV fluids and started on clindamycin.  patient admitted for  sepsis 2/2  MRSA skin abscess.    #Sepsis 2/2 Left upper back Skin Abscess 2/2  MRSA   -continue local wound care  -burn consult---may need repeat intervention (had i/d performed at  with outpatietn cx mrsa)  -monitor wbc and fever curve  -continue iv clinda   -check ESR/CRP/Procal  -pain control  -denies any h/o IVDA or similar prior skin infection---he did have MSSA peritonsillar abscess 8 months ago  -check mrsa nares  -consider ID consult if any clinical change or symptoms worsening    #Transaminitis (ALT>AST)  #h/o prior ETOH use (reports no etoh >200 days)  -monitor lft  -AbdUS -+ fatty liver change, cholelithiasis without obstruction  -check hep serology  -cotninue mvi, folate, thiamine  -outpaitent counseling for continued ETOH cessation (no need for inpatient catch team)  -hold hepatoxic meds           57 yo man with prior history of alcohol use and ex-smoker (~quit 6 months ago) presenting for treatement of left upper back skin abscess 2/2 MRSA.  patient initially noted bump on his upper back few days prior that started to grow and became tender associated with fevers. He went to urgent care and was diagnosed with skin abcess which was drained and he was discharged on cephalexin. Patient continues to have fever and fatigue. Pus culture grew MRSA and he was referred to ED for IV abx.    [place] noting that patient was admitted in April 2023 for MSSA peritonsillar abscess which was drained 6 months prior and treated with Augmentin and steroids.    In ED, patient was febrile and tachycardic but BP is normal. He was given IV fluids and started on clindamycin.  patient admitted for  sepsis 2/2  MRSA skin abscess.    #Sepsis 2/2 Left upper back Skin Abscess  -continue local wound care  -burn performed I&D on 12/14 and 12/15  -monitor wbc and fever curve  -Received IV clindamycin while admitted  -ESR 58 / CRP 59.4  -denies any h/o IVDA or similar prior skin infection---he did have MSSA peritonsillar abscess 8 months ago  -MRSA nares PCR negative  -Continue wound care with home care services: once a day dressing change with hydrogel, VASHE packing and cover with gauze/tegaderm      #Transaminitis (ALT>AST)  #h/o prior ETOH use (reports no etoh >200 days)  -LFTs downtrending  -AbdUS -+ fatty liver change, cholelithiasis without obstruction  -Acute hepatitis serology negative  -outpaitent counseling for continued ETOH cessation (no need for inpatient catch team)  -hold hepatoxic meds    Patient stable on day of discharge, symptomatically feels well and HD stable. 59 yo man with prior history of alcohol use and ex-smoker (~quit 6 months ago) presenting for treatement of left upper back skin abscess 2/2 MRSA.  patient initially noted bump on his upper back few days prior that started to grow and became tender associated with fevers. He went to urgent care and was diagnosed with skin abcess which was drained and he was discharged on cephalexin. Patient continues to have fever and fatigue. Pus culture grew MRSA and he was referred to ED for IV abx.    [place] noting that patient was admitted in April 2023 for MSSA peritonsillar abscess which was drained 6 months prior and treated with Augmentin and steroids.    In ED, patient was febrile and tachycardic but BP is normal. He was given IV fluids and started on clindamycin.  patient admitted for  sepsis 2/2  MRSA skin abscess.    #Sepsis 2/2 Left upper back Skin Abscess  -continue local wound care  -burn performed I&D on 12/14 and 12/15  -monitor wbc and fever curve  -Received IV clindamycin while admitted  -ESR 58 / CRP 59.4  -denies any h/o IVDA or similar prior skin infection---he did have MSSA peritonsillar abscess 8 months ago  -MRSA nares PCR negative  -Continue wound care with home care services: once a day dressing change with hydrogel, VASHE packing and cover with gauze/tegaderm      #Transaminitis (ALT>AST)  #h/o prior ETOH use (reports no etoh >200 days)  -LFTs downtrending  -AbdUS -+ fatty liver change, cholelithiasis without obstruction  -Acute hepatitis serology negative  -outpaitent counseling for continued ETOH cessation (no need for inpatient catch team)  -hold hepatoxic meds    Patient stable on day of discharge, symptomatically feels well and HD stable.

## 2023-12-14 NOTE — DISCHARGE NOTE PROVIDER - CARE PROVIDERS DIRECT ADDRESSES
,[email or web address]

## 2023-12-14 NOTE — CONSULT NOTE ADULT - ASSESSMENT
back abscess + MRSA    Plan :  bedside I&D planned for today   wound care rec's to follow   Culture taken follow up results   will follow  back abscess + MRSA    Plan :  bedside I&D planned for today -  wound care rec's to follow   Culture taken follow up results   will follow  back abscess + MRSA    Plan :  - bedside I&D today  - Continue local wound care: VASHE wet to dry BID  - Culture taken follow up results   - will follow

## 2023-12-14 NOTE — CONSULT NOTE ADULT - SUBJECTIVE AND OBJECTIVE BOX
HPI:  59 yo man with prior history of alcohol use and ex-smoker (~quit 6 months ago) presenting for treatement of left upper back skin abscess 2/2 MRSA.  patient initially noted bump on his upper back few days prior that started to grow and became tender associated with fevers. He went to urgent care and was diagnosed with skin abcess which was drained and he was discharged on cephalexin. Patient continues to have fever and fatigue. Pus culture grew MRSA and he was referred to ED for IV abx.    [facility] noting that patient was admitted in April 2023 for MSSA peritonsillar abscess which was drained 6 months prior and treated with Augmentin and steroids.    In ED, patient was febrile and tachycardic but BP is normal. He was given IV fluids and started on clindamycin.  patient admitted for  sepsis 2/2  MRSA skin abscess.   (12 Dec 2023 17:38)    Burn consulted to evaluate back abscess. Patient endorses history as above in HPI.     Allergies    No Known Allergies        PAST MEDICAL & SURGICAL HISTORY:  No pertinent past medical history      No significant past surgical history                            13.1   3.30  )-----------( 240      ( 14 Dec 2023 08:35 )             38.0         Exam  Gen: well developed, well nourished appearing male NAD, A&O   Wound  Upper back : ~4x4cm area of fluctuance and erythema with central white eschar + purulent drainage        HPI:  59 yo man with prior history of alcohol use and ex-smoker (~quit 6 months ago) presenting for treatement of left upper back skin abscess 2/2 MRSA.  patient initially noted bump on his upper back few days prior that started to grow and became tender associated with fevers. He went to urgent care and was diagnosed with skin abcess which was drained and he was discharged on cephalexin. Patient continues to have fever and fatigue. Pus culture grew MRSA and he was referred to ED for IV abx.    [facility] noting that patient was admitted in April 2023 for MSSA peritonsillar abscess which was drained 6 months prior and treated with Augmentin and steroids.    In ED, patient was febrile and tachycardic but BP is normal. He was given IV fluids and started on clindamycin.  patient admitted for  sepsis 2/2  MRSA skin abscess.   (12 Dec 2023 17:38)    Burn consulted to evaluate back abscess. Patient endorses history as above in HPI.     Allergies    No Known Allergies        PAST MEDICAL & SURGICAL HISTORY:  No pertinent past medical history      No significant past surgical history                            13.1   3.30  )-----------( 240      ( 14 Dec 2023 08:35 )             38.0         Exam  Gen: well developed, well nourished appearing male NAD, A&O   Wound  Upper back : ~4x4cm area of fluctuance and erythema with central white eschar + purulent drainage        HPI:  57 yo man with prior history of alcohol use and ex-smoker (~quit 6 months ago) presenting for treatement of left upper back skin abscess 2/2 MRSA.  patient initially noted bump on his upper back few days prior that started to grow and became tender associated with fevers. He went to urgent care and was diagnosed with skin abcess which was drained and he was discharged on cephalexin. Patient continues to have fever and fatigue. Pus culture grew MRSA and he was referred to ED for IV abx.    [facility] noting that patient was admitted in April 2023 for MSSA peritonsillar abscess which was drained 6 months prior and treated with Augmentin and steroids.    In ED, patient was febrile and tachycardic but BP is normal. He was given IV fluids and started on clindamycin.  patient admitted for  sepsis 2/2  MRSA skin abscess.   (12 Dec 2023 17:38)    Burn consulted to evaluate back abscess. Patient endorses history as above in HPI.     Allergies    No Known Allergies        PAST MEDICAL & SURGICAL HISTORY:  No pertinent past medical history      No significant past surgical history                            13.1   3.30  )-----------( 240      ( 14 Dec 2023 08:35 )             38.0         Exam  Gen: well developed, well nourished appearing male NAD, A&O   Wound  Upper back : ~4x4cm area of fluctuance and erythema with central white purulent skin necrosis  and multiple surrounding pustules / sinus tracks eschar + purulent drainage        HPI:  57 yo man with prior history of alcohol use and ex-smoker (~quit 6 months ago) presenting for treatement of left upper back skin abscess 2/2 MRSA.  patient initially noted bump on his upper back few days prior that started to grow and became tender associated with fevers. He went to urgent care and was diagnosed with skin abcess which was drained and he was discharged on cephalexin. Patient continues to have fever and fatigue. Pus culture grew MRSA and he was referred to ED for IV abx.    [place] noting that patient was admitted in April 2023 for MSSA peritonsillar abscess which was drained 6 months prior and treated with Augmentin and steroids.    In ED, patient was febrile and tachycardic but BP is normal. He was given IV fluids and started on clindamycin.  patient admitted for  sepsis 2/2  MRSA skin abscess.   (12 Dec 2023 17:38)    Burn consulted to evaluate back abscess. Patient endorses history as above in HPI.     Allergies    No Known Allergies        PAST MEDICAL & SURGICAL HISTORY:  No pertinent past medical history      No significant past surgical history                            13.1   3.30  )-----------( 240      ( 14 Dec 2023 08:35 )             38.0         Exam  Gen: well developed, well nourished appearing male NAD, A&O   Wound  Upper back : ~4x4cm area of fluctuance and erythema with central white purulent skin necrosis  and multiple surrounding pustules / sinus tracks eschar + purulent drainage

## 2023-12-14 NOTE — DISCHARGE NOTE PROVIDER - NSDCCPCAREPLAN_GEN_ALL_CORE_FT
PRINCIPAL DISCHARGE DIAGNOSIS  Diagnosis: Abscess  Assessment and Plan of Treatment: you came to the hospital because you were found to have an abscess on your upper back. You were seen by the medical and burn team which evaluated your abscess. You are being discharged with antibiotics. Please follow up with your PCP in 2 weeks from discharge.     PRINCIPAL DISCHARGE DIAGNOSIS  Diagnosis: Abscess  Assessment and Plan of Treatment: you came to the hospital because you were found to have an abscess on your upper back. You were seen by the medical and burn team which evaluated your abscess. The burn team performed drainage of the abscess. You are being discharged with antibiotics. Please finish the full course even if you feel better. Please follow up with your PCP in 2 weeks from discharge. Please also make an appointment to see burn team. You are being discharged with [facility] services who will help you with daily wound care.     PRINCIPAL DISCHARGE DIAGNOSIS  Diagnosis: Abscess  Assessment and Plan of Treatment: you came to the hospital because you were found to have an abscess on your upper back. You were seen by the medical and burn team which evaluated your abscess. The burn team performed drainage of the abscess. You are being discharged with antibiotics. Please finish the full course even if you feel better. Please follow up with your PCP in 2 weeks from discharge. Please also make an appointment to see burn team. You are being discharged with [facility] services who will help you with daily wound care.

## 2023-12-14 NOTE — PROGRESS NOTE ADULT - SUBJECTIVE AND OBJECTIVE BOX
Patient is a 58y old  Male who presents with a chief complaint of left upper back skin abscess MRSA (12 Dec 2023 17:38)    HPI:  57 yo man with prior history of alcohol use and ex-smoker (~quit 6 months ago) presenting for treatement of left upper back skin abscess 2/2 MRSA.  patient initially noted bump on his upper back few days prior that started to grow and became tender associated with fevers. He went to urgent care and was diagnosed with skin abcess which was drained and he was discharged on cephalexin. Patient continues to have fever and fatigue. Pus culture grew MRSA and he was referred to ED for IV abx.    [facility] noting that patient was admitted in April 2023 for MSSA peritonsillar abscess which was drained 6 months prior and treated with Augmentin and steroids.    In ED, patient was febrile and tachycardic but BP is normal. He was given IV fluids and started on clindamycin.  patient admitted for  sepsis 2/2  MRSA skin abscess.   (12 Dec 2023 17:38)    patient seen and examined independently on morning rounds, chart reviewed and discussed with the medicine resident and on interdisciplinary rounds.    hospital day #2    awaiting burn bedside i/d today---still c/o pain around area but slightly better- due to location if wound packed will ann need hha and services for wound care on dc (he lives alone)-         PE:  GEN-NAD, AAOx3  SKIN: left 4x4 round raised lesion with surrounding erythema (receding from pen marks) and central white pus/drainage and inferior area with induration  PULM- ctab fair air entry  CVS- +s1/s2 RRR   GI- soft NT ND +bs, no rebound, no guarding  EXT- no edema        Labs:                        13.1   3.30  )-----------( 240      ( 14 Dec 2023 08:35 )             38.0     CBC Full  -  ( 14 Dec 2023 08:35 )  WBC Count : 3.30 K/uL  RBC Count : 4.63 M/uL  Hemoglobin : 13.1 g/dL  Hematocrit : 38.0 %  Platelet Count - Automated : 240 K/uL  Mean Cell Volume : 82.1 fL  Mean Cell Hemoglobin : 28.3 pg  Mean Cell Hemoglobin Concentration : 34.5 g/dL  Auto Neutrophil # : 1.08 K/uL  Auto Lymphocyte # : 1.14 K/uL  Auto Monocyte # : 0.38 K/uL  Auto Eosinophil # : 0.32 K/uL  Auto Basophil # : 0.03 K/uL  Auto Neutrophil % : 32.8 %  Auto Lymphocyte % : 34.5 %  Auto Monocyte % : 11.5 %  Auto Eosinophil % : 9.7 %  Auto Basophil % : 0.9 %      12-14    139  |  100  |  13  ----------------------------<  114<H>  3.6   |  29  |  1.0    Ca    9.1      14 Dec 2023 08:35  Mg     1.8     12-14    TPro  6.5  /  Alb  3.8  /  TBili  0.4  /  DBili  x   /  AST  27  /  ALT  88<H>  /  AlkPhos  135<H>  12-14      Microbiology:    Culture - Urine (collected 13 Dec 2023 03:15)  Source: Clean Catch Clean Catch (Midstream)  Final Report (14 Dec 2023 11:26):    <10,000 CFU/mL Normal Urogenital Amanda    Culture - Blood (collected 12 Dec 2023 10:40)  Source: .Blood Blood-Peripheral  Preliminary Report (14 Dec 2023 04:02):    No growth at 24 hours    Culture - Blood (collected 12 Dec 2023 10:40)  Source: .Blood Blood-Peripheral  Preliminary Report (14 Dec 2023 04:02):    No growth at 24 hours        Vital Signs Last 24 Hrs  T(C): 36.4 (14 Dec 2023 15:55), Max: 36.5 (14 Dec 2023 07:17)  T(F): 97.6 (14 Dec 2023 15:55), Max: 97.7 (14 Dec 2023 07:17)  HR: 72 (14 Dec 2023 15:55) (70 - 72)  BP: 118/78 (14 Dec 2023 15:55) (100/59 - 118/78)  BP(mean): --  RR: 18 (14 Dec 2023 15:55) (18 - 18)  SpO2: 97% (14 Dec 2023 15:55) (97% - 97%)    Parameters below as of 14 Dec 2023 15:55  Patient On (Oxygen Delivery Method): room air        I&O's Summary      MEDICATIONS  (STANDING):  clindamycin IVPB 900 milliGRAM(s) IV Intermittent every 8 hours     Patient is a 58y old  Male who presents with a chief complaint of left upper back skin abscess MRSA (12 Dec 2023 17:38)    HPI:  57 yo man with prior history of alcohol use and ex-smoker (~quit 6 months ago) presenting for treatement of left upper back skin abscess 2/2 MRSA.  patient initially noted bump on his upper back few days prior that started to grow and became tender associated with fevers. He went to urgent care and was diagnosed with skin abcess which was drained and he was discharged on cephalexin. Patient continues to have fever and fatigue. Pus culture grew MRSA and he was referred to ED for IV abx.    [place] noting that patient was admitted in April 2023 for MSSA peritonsillar abscess which was drained 6 months prior and treated with Augmentin and steroids.    In ED, patient was febrile and tachycardic but BP is normal. He was given IV fluids and started on clindamycin.  patient admitted for  sepsis 2/2  MRSA skin abscess.   (12 Dec 2023 17:38)    patient seen and examined independently on morning rounds, chart reviewed and discussed with the medicine resident and on interdisciplinary rounds.    hospital day #2    awaiting burn bedside i/d today---still c/o pain around area but slightly better- due to location if wound packed will ann need hha and services for wound care on dc (he lives alone)-         PE:  GEN-NAD, AAOx3  SKIN: left 4x4 round raised lesion with surrounding erythema (receding from pen marks) and central white pus/drainage and inferior area with induration  PULM- ctab fair air entry  CVS- +s1/s2 RRR   GI- soft NT ND +bs, no rebound, no guarding  EXT- no edema        Labs:                        13.1   3.30  )-----------( 240      ( 14 Dec 2023 08:35 )             38.0     CBC Full  -  ( 14 Dec 2023 08:35 )  WBC Count : 3.30 K/uL  RBC Count : 4.63 M/uL  Hemoglobin : 13.1 g/dL  Hematocrit : 38.0 %  Platelet Count - Automated : 240 K/uL  Mean Cell Volume : 82.1 fL  Mean Cell Hemoglobin : 28.3 pg  Mean Cell Hemoglobin Concentration : 34.5 g/dL  Auto Neutrophil # : 1.08 K/uL  Auto Lymphocyte # : 1.14 K/uL  Auto Monocyte # : 0.38 K/uL  Auto Eosinophil # : 0.32 K/uL  Auto Basophil # : 0.03 K/uL  Auto Neutrophil % : 32.8 %  Auto Lymphocyte % : 34.5 %  Auto Monocyte % : 11.5 %  Auto Eosinophil % : 9.7 %  Auto Basophil % : 0.9 %      12-14    139  |  100  |  13  ----------------------------<  114<H>  3.6   |  29  |  1.0    Ca    9.1      14 Dec 2023 08:35  Mg     1.8     12-14    TPro  6.5  /  Alb  3.8  /  TBili  0.4  /  DBili  x   /  AST  27  /  ALT  88<H>  /  AlkPhos  135<H>  12-14      Microbiology:    Culture - Urine (collected 13 Dec 2023 03:15)  Source: Clean Catch Clean Catch (Midstream)  Final Report (14 Dec 2023 11:26):    <10,000 CFU/mL Normal Urogenital Amanda    Culture - Blood (collected 12 Dec 2023 10:40)  Source: .Blood Blood-Peripheral  Preliminary Report (14 Dec 2023 04:02):    No growth at 24 hours    Culture - Blood (collected 12 Dec 2023 10:40)  Source: .Blood Blood-Peripheral  Preliminary Report (14 Dec 2023 04:02):    No growth at 24 hours        Vital Signs Last 24 Hrs  T(C): 36.4 (14 Dec 2023 15:55), Max: 36.5 (14 Dec 2023 07:17)  T(F): 97.6 (14 Dec 2023 15:55), Max: 97.7 (14 Dec 2023 07:17)  HR: 72 (14 Dec 2023 15:55) (70 - 72)  BP: 118/78 (14 Dec 2023 15:55) (100/59 - 118/78)  BP(mean): --  RR: 18 (14 Dec 2023 15:55) (18 - 18)  SpO2: 97% (14 Dec 2023 15:55) (97% - 97%)    Parameters below as of 14 Dec 2023 15:55  Patient On (Oxygen Delivery Method): room air        I&O's Summary      MEDICATIONS  (STANDING):  clindamycin IVPB 900 milliGRAM(s) IV Intermittent every 8 hours

## 2023-12-14 NOTE — DISCHARGE NOTE PROVIDER - NSDCDCMDCOMP_GEN_ALL_CORE
This document is complete and the patient is ready for discharge. no dysuria, no frequency, and no hematuria.

## 2023-12-14 NOTE — DISCHARGE NOTE PROVIDER - NSDCFUADDINST_GEN_ALL_CORE_FT
Continue wound care with home care services: once a day dressing change with hydrogel, VASHE packing and cover with gauze/tegaderm

## 2023-12-14 NOTE — DISCHARGE NOTE PROVIDER - ATTENDING DISCHARGE PHYSICAL EXAMINATION:
patient seen and examined independently on morning rounds, chart reviewed and discussed with medicine resident and agree with the above discharge note     time spendt on discharge >30 minutes including coordination of discharge care  s/p I/D of Left upper back abscess yesterday 12/14- pain resolved- afebrile  awaiting home vns for wound change and daily packing  patient to f/u with pcp Dr. Hyatt   f/u with Burn clinic within 2 weeks (also f/u with culture sent during I/D---prior cx at  was +MRSA)  local wound care  continue oral abx--->transition to bactrim x 10 more days to complete 14 day course for MRSA abscess      discharge home today

## 2023-12-14 NOTE — PROCEDURE NOTE - NSICDXPROCEDURE_GEN_ALL_CORE_FT
PROCEDURES:  Drainage of complex abscess 14-Dec-2023 14:59:54 with excisional debridement of necrotic skin, subcutis and fascia 3 x 3 cm LongMelia

## 2023-12-14 NOTE — DISCHARGE NOTE PROVIDER - PROVIDER TOKENS
PROVIDER:[TOKEN:[[id number]],FOLLOWUP:[2 weeks],ESTABLISHEDPATIENT:[T]] PROVIDER:[TOKEN:[[id number]],FOLLOWUP:[2 weeks],ESTABLISHEDPATIENT:[T]] PROVIDER:[TOKEN:[[id number]],FOLLOWUP:[2 weeks],ESTABLISHEDPATIENT:[T]],PROVIDER:[TOKEN:[[id number]],FOLLOWUP:[1 week]] PROVIDER:[TOKEN:[[id number]],FOLLOWUP:[2 weeks],ESTABLISHEDPATIENT:[T]],PROVIDER:[TOKEN:[[id number]],FOLLOWUP:[1 week]]

## 2023-12-14 NOTE — DISCHARGE NOTE PROVIDER - CARE PROVIDER_API CALL
Amanuel Hyatt  Internal Medicine  [address] [place] [address]  Phone: [phone]  Fax: [phone]  Established Patient  Follow Up Time: 2 weeks   Amanuel Hyatt  Internal Medicine  [address] [place] [address]  Phone: [phone]  Fax: [phone]  Established Patient  Follow Up Time: 2 weeks   Amanuel Hyatt  Internal Medicine  [address] [place] [address]  Phone: [phone]  Fax: [phone]  Established Patient  Follow Up Time: 2 weeks    Ezekiel Hoffman  Plastic Surgery  [address] [place] [address]  Phone: [phone]  Fax: [phone]  Follow Up Time: 1 week   Amanuel Hyatt  Internal Medicine  [address] [place] [address]  Phone: [phone]  Fax: [phone]  Established Patient  Follow Up Time: 2 weeks    Ezekiel Hoffman  Plastic Surgery  [address] [place] [address]  Phone: [phone]  Fax: [phone]  Follow Up Time: 1 week

## 2023-12-15 ENCOUNTER — TRANSCRIPTION ENCOUNTER (OUTPATIENT)
Age: 58
End: 2023-12-15

## 2023-12-15 VITALS
TEMPERATURE: 98 F | RESPIRATION RATE: 18 BRPM | OXYGEN SATURATION: 97 % | HEART RATE: 70 BPM | SYSTOLIC BLOOD PRESSURE: 94 MMHG | DIASTOLIC BLOOD PRESSURE: 52 MMHG

## 2023-12-15 LAB
ALBUMIN SERPL ELPH-MCNC: 3.8 G/DL — SIGNIFICANT CHANGE UP (ref 3.5–5.2)
ALBUMIN SERPL ELPH-MCNC: 3.8 G/DL — SIGNIFICANT CHANGE UP (ref 3.5–5.2)
ALP SERPL-CCNC: 116 U/L — HIGH (ref 30–115)
ALP SERPL-CCNC: 116 U/L — HIGH (ref 30–115)
ALT FLD-CCNC: 68 U/L — HIGH (ref 0–41)
ALT FLD-CCNC: 68 U/L — HIGH (ref 0–41)
ANION GAP SERPL CALC-SCNC: 7 MMOL/L — SIGNIFICANT CHANGE UP (ref 7–14)
ANION GAP SERPL CALC-SCNC: 7 MMOL/L — SIGNIFICANT CHANGE UP (ref 7–14)
AST SERPL-CCNC: 19 U/L — SIGNIFICANT CHANGE UP (ref 0–41)
AST SERPL-CCNC: 19 U/L — SIGNIFICANT CHANGE UP (ref 0–41)
BASOPHILS # BLD AUTO: 0.04 K/UL — SIGNIFICANT CHANGE UP (ref 0–0.2)
BASOPHILS # BLD AUTO: 0.04 K/UL — SIGNIFICANT CHANGE UP (ref 0–0.2)
BASOPHILS NFR BLD AUTO: 1 % — SIGNIFICANT CHANGE UP (ref 0–1)
BASOPHILS NFR BLD AUTO: 1 % — SIGNIFICANT CHANGE UP (ref 0–1)
BILIRUB SERPL-MCNC: 0.4 MG/DL — SIGNIFICANT CHANGE UP (ref 0.2–1.2)
BILIRUB SERPL-MCNC: 0.4 MG/DL — SIGNIFICANT CHANGE UP (ref 0.2–1.2)
BUN SERPL-MCNC: 14 MG/DL — SIGNIFICANT CHANGE UP (ref 10–20)
BUN SERPL-MCNC: 14 MG/DL — SIGNIFICANT CHANGE UP (ref 10–20)
CALCIUM SERPL-MCNC: 9.3 MG/DL — SIGNIFICANT CHANGE UP (ref 8.4–10.5)
CALCIUM SERPL-MCNC: 9.3 MG/DL — SIGNIFICANT CHANGE UP (ref 8.4–10.5)
CHLORIDE SERPL-SCNC: 101 MMOL/L — SIGNIFICANT CHANGE UP (ref 98–110)
CHLORIDE SERPL-SCNC: 101 MMOL/L — SIGNIFICANT CHANGE UP (ref 98–110)
CO2 SERPL-SCNC: 31 MMOL/L — SIGNIFICANT CHANGE UP (ref 17–32)
CO2 SERPL-SCNC: 31 MMOL/L — SIGNIFICANT CHANGE UP (ref 17–32)
CREAT SERPL-MCNC: 1.1 MG/DL — SIGNIFICANT CHANGE UP (ref 0.7–1.5)
CREAT SERPL-MCNC: 1.1 MG/DL — SIGNIFICANT CHANGE UP (ref 0.7–1.5)
EGFR: 78 ML/MIN/1.73M2 — SIGNIFICANT CHANGE UP
EGFR: 78 ML/MIN/1.73M2 — SIGNIFICANT CHANGE UP
EOSINOPHIL # BLD AUTO: 0.27 K/UL — SIGNIFICANT CHANGE UP (ref 0–0.7)
EOSINOPHIL # BLD AUTO: 0.27 K/UL — SIGNIFICANT CHANGE UP (ref 0–0.7)
EOSINOPHIL NFR BLD AUTO: 7 % — SIGNIFICANT CHANGE UP (ref 0–8)
EOSINOPHIL NFR BLD AUTO: 7 % — SIGNIFICANT CHANGE UP (ref 0–8)
GLUCOSE SERPL-MCNC: 119 MG/DL — HIGH (ref 70–99)
GLUCOSE SERPL-MCNC: 119 MG/DL — HIGH (ref 70–99)
HCT VFR BLD CALC: 39 % — LOW (ref 42–52)
HCT VFR BLD CALC: 39 % — LOW (ref 42–52)
HGB BLD-MCNC: 13.2 G/DL — LOW (ref 14–18)
HGB BLD-MCNC: 13.2 G/DL — LOW (ref 14–18)
IMM GRANULOCYTES NFR BLD AUTO: 0.3 % — SIGNIFICANT CHANGE UP (ref 0.1–0.3)
IMM GRANULOCYTES NFR BLD AUTO: 0.3 % — SIGNIFICANT CHANGE UP (ref 0.1–0.3)
LYMPHOCYTES # BLD AUTO: 1.83 K/UL — SIGNIFICANT CHANGE UP (ref 1.2–3.4)
LYMPHOCYTES # BLD AUTO: 1.83 K/UL — SIGNIFICANT CHANGE UP (ref 1.2–3.4)
LYMPHOCYTES # BLD AUTO: 47.8 % — SIGNIFICANT CHANGE UP (ref 20.5–51.1)
LYMPHOCYTES # BLD AUTO: 47.8 % — SIGNIFICANT CHANGE UP (ref 20.5–51.1)
MAGNESIUM SERPL-MCNC: 1.7 MG/DL — LOW (ref 1.8–2.4)
MAGNESIUM SERPL-MCNC: 1.7 MG/DL — LOW (ref 1.8–2.4)
MCHC RBC-ENTMCNC: 28.1 PG — SIGNIFICANT CHANGE UP (ref 27–31)
MCHC RBC-ENTMCNC: 28.1 PG — SIGNIFICANT CHANGE UP (ref 27–31)
MCHC RBC-ENTMCNC: 33.8 G/DL — SIGNIFICANT CHANGE UP (ref 32–37)
MCHC RBC-ENTMCNC: 33.8 G/DL — SIGNIFICANT CHANGE UP (ref 32–37)
MCV RBC AUTO: 83 FL — SIGNIFICANT CHANGE UP (ref 80–94)
MCV RBC AUTO: 83 FL — SIGNIFICANT CHANGE UP (ref 80–94)
MONOCYTES # BLD AUTO: 0.57 K/UL — SIGNIFICANT CHANGE UP (ref 0.1–0.6)
MONOCYTES # BLD AUTO: 0.57 K/UL — SIGNIFICANT CHANGE UP (ref 0.1–0.6)
MONOCYTES NFR BLD AUTO: 14.9 % — HIGH (ref 1.7–9.3)
MONOCYTES NFR BLD AUTO: 14.9 % — HIGH (ref 1.7–9.3)
NEUTROPHILS # BLD AUTO: 1.11 K/UL — LOW (ref 1.4–6.5)
NEUTROPHILS # BLD AUTO: 1.11 K/UL — LOW (ref 1.4–6.5)
NEUTROPHILS NFR BLD AUTO: 29 % — LOW (ref 42.2–75.2)
NEUTROPHILS NFR BLD AUTO: 29 % — LOW (ref 42.2–75.2)
NRBC # BLD: 0 /100 WBCS — SIGNIFICANT CHANGE UP (ref 0–0)
NRBC # BLD: 0 /100 WBCS — SIGNIFICANT CHANGE UP (ref 0–0)
PLATELET # BLD AUTO: 273 K/UL — SIGNIFICANT CHANGE UP (ref 130–400)
PLATELET # BLD AUTO: 273 K/UL — SIGNIFICANT CHANGE UP (ref 130–400)
PMV BLD: 10.4 FL — SIGNIFICANT CHANGE UP (ref 7.4–10.4)
PMV BLD: 10.4 FL — SIGNIFICANT CHANGE UP (ref 7.4–10.4)
POTASSIUM SERPL-MCNC: 4.1 MMOL/L — SIGNIFICANT CHANGE UP (ref 3.5–5)
POTASSIUM SERPL-MCNC: 4.1 MMOL/L — SIGNIFICANT CHANGE UP (ref 3.5–5)
POTASSIUM SERPL-SCNC: 4.1 MMOL/L — SIGNIFICANT CHANGE UP (ref 3.5–5)
POTASSIUM SERPL-SCNC: 4.1 MMOL/L — SIGNIFICANT CHANGE UP (ref 3.5–5)
PROT SERPL-MCNC: 6.4 G/DL — SIGNIFICANT CHANGE UP (ref 6–8)
PROT SERPL-MCNC: 6.4 G/DL — SIGNIFICANT CHANGE UP (ref 6–8)
RBC # BLD: 4.7 M/UL — SIGNIFICANT CHANGE UP (ref 4.7–6.1)
RBC # BLD: 4.7 M/UL — SIGNIFICANT CHANGE UP (ref 4.7–6.1)
RBC # FLD: 11.5 % — SIGNIFICANT CHANGE UP (ref 11.5–14.5)
RBC # FLD: 11.5 % — SIGNIFICANT CHANGE UP (ref 11.5–14.5)
SODIUM SERPL-SCNC: 139 MMOL/L — SIGNIFICANT CHANGE UP (ref 135–146)
SODIUM SERPL-SCNC: 139 MMOL/L — SIGNIFICANT CHANGE UP (ref 135–146)
WBC # BLD: 3.83 K/UL — LOW (ref 4.8–10.8)
WBC # BLD: 3.83 K/UL — LOW (ref 4.8–10.8)
WBC # FLD AUTO: 3.83 K/UL — LOW (ref 4.8–10.8)
WBC # FLD AUTO: 3.83 K/UL — LOW (ref 4.8–10.8)

## 2023-12-15 PROCEDURE — 99231 SBSQ HOSP IP/OBS SF/LOW 25: CPT

## 2023-12-15 RX ADMIN — Medication 100 MILLIGRAM(S): at 05:34

## 2023-12-15 RX ADMIN — Medication 100 MILLIGRAM(S): at 14:00

## 2023-12-15 RX ADMIN — FAMOTIDINE 20 MILLIGRAM(S): 10 INJECTION INTRAVENOUS at 16:01

## 2023-12-15 NOTE — DISCHARGE NOTE NURSING/CASE MANAGEMENT/SOCIAL WORK - PATIENT PORTAL LINK FT
You can access the FollowMyHealth Patient Portal offered by [facility] by registering at the following website: [email or web address]. By joining [employer]’s FollowMyHealth portal, you will also be able to view your health information using other applications (apps) compatible with our system. You can access the FollowMyHealth Patient Portal offered by [facility] by registering at the following website: [email or web address]. By joining [employer]’s FollowMyHealth portal, you will also be able to view your health information using other applications (apps) compatible with our system.

## 2023-12-15 NOTE — PROGRESS NOTE ADULT - REASON FOR ADMISSION
left upper back skin abscess MRSA

## 2023-12-15 NOTE — PROGRESS NOTE ADULT - SUBJECTIVE AND OBJECTIVE BOX
POD#1 s/p bedside debridement of back    ICU Vital Signs Last 24 Hrs  T(C): 36.6 (15 Dec 2023 08:06), Max: 36.7 (14 Dec 2023 23:40)  T(F): 97.9 (15 Dec 2023 08:06), Max: 98.1 (14 Dec 2023 23:40)  HR: 68 (15 Dec 2023 08:06) (68 - 75)  BP: 112/56 (15 Dec 2023 08:06) (106/70 - 118/78)  BP(mean): 78 (15 Dec 2023 08:06) (78 - 78)  RR: 18 (15 Dec 2023 08:06) (18 - 18)  SpO2: 98% (15 Dec 2023 08:06) (96% - 98%)    O2 Parameters below as of 15 Dec 2023 08:06  Patient On (Oxygen Delivery Method): room air                          13.2   3.83  )-----------( 273      ( 15 Dec 2023 06:12 )             39.0     Exam  Gen: well developed, well nourished appearing male NAD, A&O   Wound  Upper back : erythema and edema improved, 3x3cm open wound with pink moist wound base, no purulence, no drainage, no bleeding, no foul odor noted.   dressing changed with hydrogel, vashe packing and cover with gauze/tegaderm

## 2023-12-15 NOTE — PROGRESS NOTE ADULT - ASSESSMENT
back abscess + MRSA    - s/p bedside I&D of back 12/15  - Continue local wound care: hydrogel, VASHE wet to dry BID  - f/u wound culture  - abd  - stable for d/c, may follow up in burn clinic [phone]  - will follow  back abscess + MRSA    - s/p bedside I&D of back 12/15  - Continue local wound care: hydrogel, VASHE wet to dry BID  - f/u wound culture  - abd  - stable for d/c, may follow up in burn clinic [phone]  - will follow

## 2023-12-15 NOTE — DISCHARGE NOTE NURSING/CASE MANAGEMENT/SOCIAL WORK - NSDCPEFALRISK_GEN_ALL_CORE
For information on Fall & Injury Prevention, visit: [email or web address] OR  [email or web address] OR  https://www.cdc.gov/steadi/patient.html For information on Fall & Injury Prevention, visit: [email or web address] OR  [email or web address]-injury OR  https://www.cdc.gov/steadi/patient.html

## 2023-12-17 LAB
-  AMPICILLIN/SULBACTAM: SIGNIFICANT CHANGE UP
-  AMPICILLIN/SULBACTAM: SIGNIFICANT CHANGE UP
-  CEFAZOLIN: SIGNIFICANT CHANGE UP
-  CEFAZOLIN: SIGNIFICANT CHANGE UP
-  CLINDAMYCIN: SIGNIFICANT CHANGE UP
-  CLINDAMYCIN: SIGNIFICANT CHANGE UP
-  DAPTOMYCIN: SIGNIFICANT CHANGE UP
-  DAPTOMYCIN: SIGNIFICANT CHANGE UP
-  ERYTHROMYCIN: SIGNIFICANT CHANGE UP
-  ERYTHROMYCIN: SIGNIFICANT CHANGE UP
-  GENTAMICIN: SIGNIFICANT CHANGE UP
-  GENTAMICIN: SIGNIFICANT CHANGE UP
-  LINEZOLID: SIGNIFICANT CHANGE UP
-  LINEZOLID: SIGNIFICANT CHANGE UP
-  OXACILLIN: SIGNIFICANT CHANGE UP
-  OXACILLIN: SIGNIFICANT CHANGE UP
-  PENICILLIN: SIGNIFICANT CHANGE UP
-  PENICILLIN: SIGNIFICANT CHANGE UP
-  RIFAMPIN: SIGNIFICANT CHANGE UP
-  RIFAMPIN: SIGNIFICANT CHANGE UP
-  TETRACYCLINE: SIGNIFICANT CHANGE UP
-  TETRACYCLINE: SIGNIFICANT CHANGE UP
-  TRIMETHOPRIM/SULFAMETHOXAZOLE: SIGNIFICANT CHANGE UP
-  TRIMETHOPRIM/SULFAMETHOXAZOLE: SIGNIFICANT CHANGE UP
-  VANCOMYCIN: SIGNIFICANT CHANGE UP
-  VANCOMYCIN: SIGNIFICANT CHANGE UP
CULTURE RESULTS: ABNORMAL
CULTURE RESULTS: ABNORMAL
METHOD TYPE: SIGNIFICANT CHANGE UP
METHOD TYPE: SIGNIFICANT CHANGE UP
ORGANISM # SPEC MICROSCOPIC CNT: ABNORMAL
ORGANISM # SPEC MICROSCOPIC CNT: ABNORMAL
ORGANISM # SPEC MICROSCOPIC CNT: SIGNIFICANT CHANGE UP
ORGANISM # SPEC MICROSCOPIC CNT: SIGNIFICANT CHANGE UP
SPECIMEN SOURCE: SIGNIFICANT CHANGE UP
SPECIMEN SOURCE: SIGNIFICANT CHANGE UP

## 2023-12-18 LAB
CULTURE RESULTS: SIGNIFICANT CHANGE UP
SPECIMEN SOURCE: SIGNIFICANT CHANGE UP

## 2023-12-21 DIAGNOSIS — A41.02 SEPSIS DUE TO METHICILLIN RESISTANT STAPHYLOCOCCUS AUREUS: ICD-10-CM

## 2023-12-21 DIAGNOSIS — F10.90 ALCOHOL USE, UNSPECIFIED, UNCOMPLICATED: ICD-10-CM

## 2023-12-21 DIAGNOSIS — R74.01 ELEVATION OF LEVELS OF LIVER TRANSAMINASE LEVELS: ICD-10-CM

## 2023-12-21 DIAGNOSIS — B95.62 METHICILLIN RESISTANT STAPHYLOCOCCUS AUREUS INFECTION AS THE CAUSE OF DISEASES CLASSIFIED ELSEWHERE: ICD-10-CM

## 2023-12-21 DIAGNOSIS — L02.212 CUTANEOUS ABSCESS OF BACK [ANY PART, EXCEPT BUTTOCK]: ICD-10-CM

## 2024-03-07 NOTE — PROCEDURE NOTE - ESTIMATED BLOOD LOSS
Pediatric Emergency Department Note  [facility]  Subjective   History of Present Illness:  Donnie Basilio is a 3 m.o. male with no significant PMH here today for cough. Cough and congestion started on Sunday. Overnight was breathing harder and feeding a little less when this happened. No fever. No vomiting. No diarrhea. Feeds every 2-3 hours on mom's breast. Normal number of wet diapers. Mom has suction bulb at home. Normal wet number of wet diapers. Eating, drinking, pooping, and peeing normally.    Birth Hx: Born at 39w. Complications: No NICU stay.   Developmental Hx: No concerns from pediatrician.   PMHx: No recent ED visits, urgent care visits, hospitalizations, or specialist visits.  PSHx: none  Medications: none  Allergies: no known  Immunizations: up to date  SHx: lives with his little brother and mom  FHx: little brother is kayla    Past Medical History:  History reviewed. No pertinent past medical history.    Surgical History:  History reviewed. No pertinent surgical history.    Family History:  No family history on file.    Medications Prior to Admission:  No current facility-administered medications on file prior to encounter.     Current Outpatient Medications on File Prior to Encounter   Medication Sig Dispense Refill    cholecalciferol (D-Vi-Sol) 10 mcg/mL (400 unit/mL) drops Take 1 mL (400 Units) by mouth once daily. 120 mL 0    sodium chloride (Ayr Saline) 0.65 % nasal drops Administer 1 drop into each nostril if needed for congestion. 30 mL 0    sodium chloride (Ocean) 0.65 % nasal spray Administer 1 spray into each nostril if needed for congestion. 30 mL 0    zinc oxide 40 % ointment ointment Apply 1 Application topically every 3 hours if needed (diaper rash). 24 g 0      Allergies:  No Known Allergies     Social History:  Social History     Socioeconomic History    Marital status: Single     Spouse name: Not on file    Number of children: Not on file    Years of  education: Not on file    Highest education level: Not on file   Occupational History    Not on file   Tobacco Use    Smoking status: Not on file    Smokeless tobacco: Not on file   Substance and Sexual Activity    Alcohol use: Not on file    Drug use: Not on file    Sexual activity: Not on file   Other Topics Concern    Not on file   Social History Narrative    Not on file     Social Determinants of Health     Financial Resource Strain: Not on file   Food Insecurity: Not on file   Transportation Needs: Not on file   Housing Stability: Not on file     Review of Systems   Constitutional:  Negative for fever.   HENT:  Positive for congestion.    Respiratory:  Positive for cough.    Gastrointestinal:  Negative for diarrhea and vomiting.   Genitourinary:  Negative for decreased urine volume.   Skin:  Negative for rash.     Objective   Vitals:      2/11/2024     8:53 PM 2/12/2024     1:49 AM 2/12/2024     4:34 AM 2/12/2024     9:55 AM 2/12/2024     1:06 PM 3/7/2024    10:59 AM 3/7/2024     1:03 PM   Vitals   Systolic 90 109 95 83 76  156   Diastolic 55 63 53 44 53  59   Heart Rate 126 113 135 110 150 156 127   Temp 36.5 °C (97.7 °F) 36.8 °C (98.2 °F) 36.4 °C (97.5 °F) 36.3 °C (97.3 °F) 36.3 °C (97.3 °F) 37 °C (98.6 °F) 37.3 °C (99.1 °F)   Resp 47 47 52 34 40 44 32   Weight (lb)      16.27 16.27   Visit Report      Report Report     Physical Exam  Constitutional:       General: He is active.   HENT:      Head: Normocephalic. Anterior fontanelle is full.      Right Ear: Tympanic membrane, ear canal and external ear normal.      Left Ear: Tympanic membrane, ear canal and external ear normal.      Nose: Nose normal.      Mouth/Throat:      Mouth: Mucous membranes are moist.   Eyes:      Extraocular Movements: Extraocular movements intact.      Conjunctiva/sclera: Conjunctivae normal.      Pupils: Pupils are equal, round, and reactive to light.   Cardiovascular:      Rate and Rhythm: Normal rate and regular rhythm.       Pulses: Normal pulses.      Heart sounds: Normal heart sounds.   Pulmonary:      Effort: Pulmonary effort is normal.      Breath sounds: Rhonchi present.   Abdominal:      General: There is no distension.      Palpations: Abdomen is soft.      Tenderness: There is no abdominal tenderness.   Genitourinary:     Penis: Normal and circumcised.       Testes: Normal.   Musculoskeletal:         General: Normal range of motion.      Cervical back: Normal range of motion.   Skin:     General: Skin is warm.      Capillary Refill: Capillary refill takes less than 2 seconds.      Turgor: Normal.   Neurological:      General: No focal deficit present.      Mental Status: He is alert.       No results found for this or any previous visit (from the past 24 hour(s)).    No image results found.    Diagnoses as of 03/07/24 1513   Bronchiolitis     Medical Decision-Making:   Interventions: none  Diagnostic testing: none  Consultations: none    Assessment/Plan   Donnie is a 3 m.o. male whose clinical presentation is most consistent with bronchiolitis. No concern on exam for AOM, pneumonia, or strep throat. Plan of care includes supportive care with suctioning and fluids.     Disposition to home:  Patient is overall well appearing, improved after the above interventions, and stable for discharge home with strict return precautions. We discussed the expected time course of symptoms. We discussed return to care if vomiting and unable to keep anything down. Advised close follow-up with pediatrician within a few days, or sooner if symptoms worsen.     Patient seen and staffed with Dr. Cunningham. Family agreeable to plan.    Alexandra Bustillo MD  Pediatrics PGY-1     Alexandra Bustillo MD  Resident  03/07/24 2900     Minimal

## 2024-05-29 ENCOUNTER — INPATIENT (INPATIENT)
Facility: HOSPITAL | Age: 59
LOS: 1 days | Discharge: HOME | DRG: 153 | End: 2024-05-31
Attending: INTERNAL MEDICINE | Admitting: STUDENT IN AN ORGANIZED HEALTH CARE EDUCATION/TRAINING PROGRAM
Payer: MEDICARE

## 2024-05-29 VITALS
HEART RATE: 126 BPM | OXYGEN SATURATION: 94 % | WEIGHT: 149.91 LBS | SYSTOLIC BLOOD PRESSURE: 135 MMHG | RESPIRATION RATE: 18 BRPM | TEMPERATURE: 99 F | DIASTOLIC BLOOD PRESSURE: 82 MMHG | HEIGHT: 68 IN

## 2024-05-29 DIAGNOSIS — S12.500A UNSPECIFIED DISPLACED FRACTURE OF SIXTH CERVICAL VERTEBRA, INITIAL ENCOUNTER FOR CLOSED FRACTURE: ICD-10-CM

## 2024-05-29 LAB
ALBUMIN SERPL ELPH-MCNC: 4.9 G/DL — SIGNIFICANT CHANGE UP (ref 3.5–5.2)
ALP SERPL-CCNC: 65 U/L — SIGNIFICANT CHANGE UP (ref 30–115)
ALT FLD-CCNC: 24 U/L — SIGNIFICANT CHANGE UP (ref 0–41)
ANION GAP SERPL CALC-SCNC: 8 MMOL/L — SIGNIFICANT CHANGE UP (ref 7–14)
AST SERPL-CCNC: 15 U/L — SIGNIFICANT CHANGE UP (ref 0–41)
BASE EXCESS BLDV CALC-SCNC: 0.1 MMOL/L — SIGNIFICANT CHANGE UP (ref -2–3)
BASOPHILS # BLD AUTO: 0.03 K/UL — SIGNIFICANT CHANGE UP (ref 0–0.2)
BASOPHILS NFR BLD AUTO: 0.3 % — SIGNIFICANT CHANGE UP (ref 0–1)
BILIRUB SERPL-MCNC: 1 MG/DL — SIGNIFICANT CHANGE UP (ref 0.2–1.2)
BUN SERPL-MCNC: 13 MG/DL — SIGNIFICANT CHANGE UP (ref 10–20)
CA-I SERPL-SCNC: 1.17 MMOL/L — SIGNIFICANT CHANGE UP (ref 1.15–1.33)
CALCIUM SERPL-MCNC: 9.9 MG/DL — SIGNIFICANT CHANGE UP (ref 8.4–10.5)
CHLORIDE SERPL-SCNC: 99 MMOL/L — SIGNIFICANT CHANGE UP (ref 98–110)
CO2 SERPL-SCNC: 28 MMOL/L — SIGNIFICANT CHANGE UP (ref 17–32)
CREAT SERPL-MCNC: 1.3 MG/DL — SIGNIFICANT CHANGE UP (ref 0.7–1.5)
EGFR: 64 ML/MIN/1.73M2 — SIGNIFICANT CHANGE UP
EOSINOPHIL # BLD AUTO: 0.02 K/UL — SIGNIFICANT CHANGE UP (ref 0–0.7)
EOSINOPHIL NFR BLD AUTO: 0.2 % — SIGNIFICANT CHANGE UP (ref 0–8)
GAS PNL BLDV: 128 MMOL/L — LOW (ref 136–145)
GAS PNL BLDV: SIGNIFICANT CHANGE UP
GAS PNL BLDV: SIGNIFICANT CHANGE UP
GLUCOSE SERPL-MCNC: 109 MG/DL — HIGH (ref 70–99)
HCO3 BLDV-SCNC: 26 MMOL/L — SIGNIFICANT CHANGE UP (ref 22–29)
HCT VFR BLD CALC: 43.7 % — SIGNIFICANT CHANGE UP (ref 42–52)
HCT VFR BLDA CALC: 62 % — CRITICAL HIGH (ref 39–51)
HGB BLD CALC-MCNC: >20 G/DL — CRITICAL HIGH (ref 12.6–17.4)
HGB BLD-MCNC: 14.9 G/DL — SIGNIFICANT CHANGE UP (ref 14–18)
IMM GRANULOCYTES NFR BLD AUTO: 0.6 % — HIGH (ref 0.1–0.3)
LACTATE BLDV-MCNC: 1.9 MMOL/L — SIGNIFICANT CHANGE UP (ref 0.5–2)
LACTATE SERPL-SCNC: 0.9 MMOL/L — SIGNIFICANT CHANGE UP (ref 0.7–2)
LYMPHOCYTES # BLD AUTO: 1.31 K/UL — SIGNIFICANT CHANGE UP (ref 1.2–3.4)
LYMPHOCYTES # BLD AUTO: 13.7 % — LOW (ref 20.5–51.1)
MCHC RBC-ENTMCNC: 29.2 PG — SIGNIFICANT CHANGE UP (ref 27–31)
MCHC RBC-ENTMCNC: 34.1 G/DL — SIGNIFICANT CHANGE UP (ref 32–37)
MCV RBC AUTO: 85.5 FL — SIGNIFICANT CHANGE UP (ref 80–94)
MONOCYTES # BLD AUTO: 1.12 K/UL — HIGH (ref 0.1–0.6)
MONOCYTES NFR BLD AUTO: 11.7 % — HIGH (ref 1.7–9.3)
NEUTROPHILS # BLD AUTO: 7.01 K/UL — HIGH (ref 1.4–6.5)
NEUTROPHILS NFR BLD AUTO: 73.5 % — SIGNIFICANT CHANGE UP (ref 42.2–75.2)
NRBC # BLD: 0 /100 WBCS — SIGNIFICANT CHANGE UP (ref 0–0)
PCO2 BLDV: 45 MMHG — SIGNIFICANT CHANGE UP (ref 42–55)
PH BLDV: 7.37 — SIGNIFICANT CHANGE UP (ref 7.32–7.43)
PLATELET # BLD AUTO: 159 K/UL — SIGNIFICANT CHANGE UP (ref 130–400)
PMV BLD: 10.7 FL — HIGH (ref 7.4–10.4)
PO2 BLDV: 56 MMHG — HIGH (ref 25–45)
POTASSIUM BLDV-SCNC: 3.6 MMOL/L — SIGNIFICANT CHANGE UP (ref 3.5–5.1)
POTASSIUM SERPL-MCNC: 4.6 MMOL/L — SIGNIFICANT CHANGE UP (ref 3.5–5)
POTASSIUM SERPL-SCNC: 4.6 MMOL/L — SIGNIFICANT CHANGE UP (ref 3.5–5)
PROT SERPL-MCNC: 7.1 G/DL — SIGNIFICANT CHANGE UP (ref 6–8)
RBC # BLD: 5.11 M/UL — SIGNIFICANT CHANGE UP (ref 4.7–6.1)
RBC # FLD: 12.2 % — SIGNIFICANT CHANGE UP (ref 11.5–14.5)
SAO2 % BLDV: 90.2 % — HIGH (ref 67–88)
SODIUM SERPL-SCNC: 135 MMOL/L — SIGNIFICANT CHANGE UP (ref 135–146)
WBC # BLD: 9.55 K/UL — SIGNIFICANT CHANGE UP (ref 4.8–10.8)
WBC # FLD AUTO: 9.55 K/UL — SIGNIFICANT CHANGE UP (ref 4.8–10.8)

## 2024-05-29 PROCEDURE — 85027 COMPLETE CBC AUTOMATED: CPT

## 2024-05-29 PROCEDURE — 99222 1ST HOSP IP/OBS MODERATE 55: CPT

## 2024-05-29 PROCEDURE — 70491 CT SOFT TISSUE NECK W/DYE: CPT | Mod: 26,MC

## 2024-05-29 PROCEDURE — 85018 HEMOGLOBIN: CPT

## 2024-05-29 PROCEDURE — 83605 ASSAY OF LACTIC ACID: CPT

## 2024-05-29 PROCEDURE — 82330 ASSAY OF CALCIUM: CPT

## 2024-05-29 PROCEDURE — 87077 CULTURE AEROBIC IDENTIFY: CPT

## 2024-05-29 PROCEDURE — 99285 EMERGENCY DEPT VISIT HI MDM: CPT

## 2024-05-29 PROCEDURE — 84295 ASSAY OF SERUM SODIUM: CPT

## 2024-05-29 PROCEDURE — 36415 COLL VENOUS BLD VENIPUNCTURE: CPT

## 2024-05-29 PROCEDURE — 87640 STAPH A DNA AMP PROBE: CPT

## 2024-05-29 PROCEDURE — 82803 BLOOD GASES ANY COMBINATION: CPT

## 2024-05-29 PROCEDURE — 87641 MR-STAPH DNA AMP PROBE: CPT

## 2024-05-29 PROCEDURE — 87070 CULTURE OTHR SPECIMN AEROBIC: CPT

## 2024-05-29 PROCEDURE — 85014 HEMATOCRIT: CPT

## 2024-05-29 PROCEDURE — 80048 BASIC METABOLIC PNL TOTAL CA: CPT

## 2024-05-29 PROCEDURE — 84132 ASSAY OF SERUM POTASSIUM: CPT

## 2024-05-29 RX ORDER — NALTREXONE HYDROCHLORIDE 50 MG/1
380 TABLET, FILM COATED ORAL
Refills: 0 | DISCHARGE

## 2024-05-29 RX ORDER — LANOLIN ALCOHOL/MO/W.PET/CERES
3 CREAM (GRAM) TOPICAL AT BEDTIME
Refills: 0 | Status: DISCONTINUED | OUTPATIENT
Start: 2024-05-29 | End: 2024-05-31

## 2024-05-29 RX ORDER — ACETAMINOPHEN 500 MG
650 TABLET ORAL EVERY 6 HOURS
Refills: 0 | Status: DISCONTINUED | OUTPATIENT
Start: 2024-05-29 | End: 2024-05-31

## 2024-05-29 RX ORDER — ENOXAPARIN SODIUM 100 MG/ML
40 INJECTION SUBCUTANEOUS EVERY 24 HOURS
Refills: 0 | Status: DISCONTINUED | OUTPATIENT
Start: 2024-05-29 | End: 2024-05-31

## 2024-05-29 RX ORDER — DEXAMETHASONE 0.5 MG/5ML
6 ELIXIR ORAL EVERY 8 HOURS
Refills: 0 | Status: DISCONTINUED | OUTPATIENT
Start: 2024-05-29 | End: 2024-05-31

## 2024-05-29 RX ORDER — DEXAMETHASONE 0.5 MG/5ML
10 ELIXIR ORAL ONCE
Refills: 0 | Status: COMPLETED | OUTPATIENT
Start: 2024-05-29 | End: 2024-05-29

## 2024-05-29 RX ORDER — KETOROLAC TROMETHAMINE 30 MG/ML
30 SYRINGE (ML) INJECTION ONCE
Refills: 0 | Status: DISCONTINUED | OUTPATIENT
Start: 2024-05-29 | End: 2024-05-29

## 2024-05-29 RX ORDER — SODIUM CHLORIDE 9 MG/ML
1000 INJECTION, SOLUTION INTRAVENOUS ONCE
Refills: 0 | Status: COMPLETED | OUTPATIENT
Start: 2024-05-29 | End: 2024-05-29

## 2024-05-29 RX ORDER — AMPICILLIN SODIUM AND SULBACTAM SODIUM 250; 125 MG/ML; MG/ML
3 INJECTION, POWDER, FOR SUSPENSION INTRAMUSCULAR; INTRAVENOUS EVERY 6 HOURS
Refills: 0 | Status: DISCONTINUED | OUTPATIENT
Start: 2024-05-29 | End: 2024-05-31

## 2024-05-29 RX ORDER — ONDANSETRON 8 MG/1
4 TABLET, FILM COATED ORAL EVERY 8 HOURS
Refills: 0 | Status: DISCONTINUED | OUTPATIENT
Start: 2024-05-29 | End: 2024-05-31

## 2024-05-29 RX ORDER — PANTOPRAZOLE SODIUM 20 MG/1
40 TABLET, DELAYED RELEASE ORAL
Refills: 0 | Status: DISCONTINUED | OUTPATIENT
Start: 2024-05-29 | End: 2024-05-31

## 2024-05-29 RX ADMIN — Medication 30 MILLIGRAM(S): at 15:45

## 2024-05-29 RX ADMIN — SODIUM CHLORIDE 1000 MILLILITER(S): 9 INJECTION, SOLUTION INTRAVENOUS at 14:32

## 2024-05-29 RX ADMIN — Medication 10 MILLIGRAM(S): at 15:46

## 2024-05-29 RX ADMIN — AMPICILLIN SODIUM AND SULBACTAM SODIUM 200 GRAM(S): 250; 125 INJECTION, POWDER, FOR SUSPENSION INTRAMUSCULAR; INTRAVENOUS at 23:51

## 2024-05-29 RX ADMIN — Medication 6 MILLIGRAM(S): at 23:50

## 2024-05-29 RX ADMIN — SODIUM CHLORIDE 1000 MILLILITER(S): 9 INJECTION, SOLUTION INTRAVENOUS at 15:50

## 2024-05-29 RX ADMIN — Medication 100 MILLIGRAM(S): at 15:50

## 2024-05-29 NOTE — ED PROVIDER NOTE - ATTENDING APP SHARED VISIT CONTRIBUTION OF CARE
58 y/o male with h/o alcohol use disorder (on vivitrol injections), in ER with c/o sore throat/difficulty swallowing.  Symptoms started ~ 2 days ago.  + pain with swallowing and decreased PO intake.  + subjective fever and chills.  + body aches.  no cough.  no cp/sob.  no abd pain.  no n/v/d.  no ha/syncope. + h/o tonsillar abscess last year.  PE  - nad, nc/at, eomi, perrl, op - mmm, + L tonsillar/peritonsillar swelling, + trismus, + muffled voice, able to tolerate secretions, cta b/l, no w/r/r, rrr,  abd - soft, nt/nd, nabs, from x 4, no le swelling/tenderness, A&O x 3, no focal neuro deficits.  -ivf, steroids, iv abx, check labs, ct soft tissue neck

## 2024-05-29 NOTE — PATIENT PROFILE ADULT - FUNCTIONAL ASSESSMENT - BASIC MOBILITY 6.
In 1-3 treatments, patient will perform sit to stand transfer with close supervision 4 = No assist / stand by assistance

## 2024-05-29 NOTE — PATIENT PROFILE ADULT - FALL HARM RISK - PATIENT NEEDS ASSISTANCE
KATT HARP    Patient Age: 31 year old   Refill request by: Phone.  Caller informed to check with the pharmacy later for their refill.  If problems arise, we will contact patient.  Refill to be: ePrescribed to [place] [id number] [place] [address] pharmacy    Medication requested to be refilled:   ALPRAZolam (XANAX) 0.25 MG tablet    Patient notified refills can take 72 hours to process: yes    Patient's next appointment is scheduled for 04/08/2021    Patient's last appointment with this Provider was 06/09/2020         WEIGHT AND HEIGHT:   Wt Readings from Last 1 Encounters:   No data found for Wt     Ht Readings from Last 1 Encounters:   No data found for Ht     BMI Readings from Last 1 Encounters:   No data found for BMI       ALLERGIES:  Patient has no known allergies.  Current Outpatient Medications   Medication   • ALPRAZolam (XANAX) 0.25 MG tablet   • propranolol (INDERAL) 10 MG tablet   • amoxicillin (AMOXIL) 875 MG tablet   • amoxicillin-clavulanate (AUGMENTIN) 500-125 MG per tablet   • DISPENSE     No current facility-administered medications for this visit.        PHARMACY to use: [facility] [address] [place] [address]  Phone: [phone] Fax: [phone]          Pharmacy preference(s) on file:   [facility] [address] [place] [address]  Phone: [phone] Fax: [phone]      ROUTING: Patient's physician/staff        PCP: No Pcp         INS: Payor:  [employer] / Plan:  BE BP [id number] / Product Type: GILDA PLATINUM   PATIENT ADDRESS:  [address] [place] [address]      
No assistance needed

## 2024-05-29 NOTE — H&P ADULT - ASSESSMENT
A 59Y/M with Pmhx of alcohol use disorder (on vivitrol injections), ex-smoker (~quit 12 months ago), left peritonsillar abscess(4/2023), left upper back skin abscess 2/2 MRSA presented to the ED for the evaluation of  throat pain, odynophagia(leading to decreased PO intake) and fever a/w malaise for 2 days, Also endorses muffled voice in the beginning of symptoms onset which has improved now. The oral intake has also been more comfortable on my eval. Patient had admission in April 2023 for left PTA, L intratonsillar abscess, & uvulitis s/p FNA with aspiration of purulent material on 4/16/23.     #SIRS likely 2/2 Tonsillitis with early microabscesses  -subjective fever, tachycardia  -Labs: WNL  -CT Neck Soft Tissue w/ IV Cont : Inflamed left palatine tonsil with small foci of rim enhancements which could reflect tonsillitis with early microabscesses. No drainable fluid collection. Nonspecific bilateral level 2 and 3 cervical lymphadenopathy, likely infectious/reactive. Stable mildly enlarged left level 5 lymph node.  -S/P: LR bolus 2L, Clindamycin 900mg IV, Dexamethasone 10mg IV, Ketorolac 30mg IV in the ED    Plan:  -will continue with Unasyn>>can switch to PO Augmentin if tolerating PO   -ID consult if symptoms not resolving or hemodynamic instability  -pain control prn  -advance diet as tolerated  -Continue decadron 6mg q 8h>>d/c if patient tolerating PO intake  -F/U throat culture, Blood cx  -monitor for airway compromise      #C6 tubercle fracture  -Subacute/chronic appearing nondisplaced fracture in the left anterior tubercle of C6, new since the prior neck CT from 4/15/2023.   -OP Neurosx Follow up      #Misc  - DVT Prophylaxis: Lovenox  - GI Prophylaxis: PPI  - Diet: Soft  - Activity: AAT  - Code Status: full    -Dispo: Admit to medicine, d/c home as appropriate       A 59Y/M with Pmhx of alcohol use disorder (on vivitrol injections), ex-smoker (~quit 12 months ago, but relapsed again 3-4 cig per day), left peritonsillar abscess(4/2023), left upper back skin abscess 2/2 MRSA presented to the ED for the evaluation of  throat pain, odynophagia(leading to decreased PO intake) and fever a/w malaise for 2 days, Also endorses muffled voice in the beginning of symptoms onset which has improved now. The oral intake has also been more comfortable on my eval. Patient had admission in April 2023 for left PTA, L intratonsillar abscess, & uvulitis s/p FNA with aspiration of purulent material on 4/16/23.     #SIRS likely 2/2 Tonsillitis with early microabscesses  -subjective fever, tachycardia  -Labs: WNL  -CT Neck Soft Tissue w/ IV Cont : Inflamed left palatine tonsil with small foci of rim enhancements which could reflect tonsillitis with early microabscesses. No drainable fluid collection. Nonspecific bilateral level 2 and 3 cervical lymphadenopathy, likely infectious/reactive. Stable mildly enlarged left level 5 lymph node.  -S/P: LR bolus 2L, Clindamycin 900mg IV, Dexamethasone 10mg IV, Ketorolac 30mg IV in the ED    Plan:  -will continue with Unasyn>>can switch to PO Augmentin if tolerating PO   -ID consult if symptoms not resolving or hemodynamic instability  -pain control prn  -advance diet as tolerated  -Continue decadron 6mg q 8h>>d/c if patient tolerating PO intake  -F/U throat culture, Blood cx  -monitor for airway compromise      #C6 tubercle fracture  -CT neck: Subacute/chronic appearing nondisplaced fracture in the left anterior tubercle of C6, new since the prior neck CT from 4/15/2023.   -no h/o fall or trauma  -c/o occasional tingling on right lateral neck for about a month  -no alarming exam findings  -OP Neurosx Follow up      #Misc  - DVT Prophylaxis: Lovenox  - GI Prophylaxis: PPI  - Diet: Soft  - Activity: AAT  - Code Status: full    -Dispo: Admit to medicine, d/c home as appropriate

## 2024-05-29 NOTE — CONSULT NOTE ADULT - SUBJECTIVE AND OBJECTIVE BOX
ENT Consult Note:   Pt is a 58 year old Male with a pmh of etoh abuse, prior mrsa throat infections, a/w throat pain and fever several days. Patient had admission in April 2023 for left PTA, L intratonsillar abscess, & uvulitis s/p FNA with aspiration of purulent material on 4/16/23. Patient returns to the ED with mild throat pain and was found to have an inflamed left palatine tonsil with small foci of rim enhancements which could reflect tonsillitis with early microabscesses. No drainable fluid collection on CT Neck soft tissue. Denies pain at rest, and reports tolerating po without issues. Vocal quality now baseline. Otherwise denies any complaints at this time. No f/c/n/v/cp/SOB/diff breathing.    [ x ] A 10 Point Review of Systems was negative except where noted    PAST MEDICAL & SURGICAL HISTORY:  No pertinent past medical history  No significant past surgical history    Allergies: No Known Allergies    Vital Signs Last 24 Hrs  T(C): 37.6 (29 May 2024 16:31), Max: 37.6 (29 May 2024 16:31)  T(F): 99.7 (29 May 2024 16:31), Max: 99.7 (29 May 2024 16:31)  HR: 94 (29 May 2024 16:31) (94 - 126)  BP: 104/65 (29 May 2024 16:31) (104/65 - 135/82)  RR: 18 (29 May 2024 16:31) (18 - 18)  SpO2: 95% (29 May 2024 16:31) (94% - 95%)    Parameters below as of 29 May 2024 12:33  Patient On (Oxygen Delivery Method): room air    PHYSICAL EXAM:  Gen: Well-developed, well-nourished, NAD.  No drooling or pooling of secretions.  Good vocal quality, no hoarseness  Skin: Good color, non diaphoretic  Head: NC/AT.   EENT:  Oral cavity no erythema/edema. Tongue wnl, uvula midline, no tenderness throughout FOM. Poot dentition. +erythema to left palatine tonsillar arch. Left erythematous tonsil without exudates. Right tonsil nonerythematous. Posterior oropharynx clear, no blood/discharge noted.   Neck: Trachea midline, supple  Resp: Breathing easily, no accessory muscle use, no stridor or stertor.    Cardio: +S1/S2  Abd: Soft, nontender, nondistended  Neuro: Awake and alert  Psych: Normal mood, normal affect  Ext: No peripheral edema/cyanosis, CEBALLOS x 4    LABS:                      14.9   9.55  )-----------( 159      ( 29 May 2024 14:41 )             43.7     05-29    135  |  99  |  13  ----------------------------<  109<H>  4.6   |  28  |  1.3    Ca    9.9      29 May 2024 14:41    TPro  7.1  /  Alb  4.9  /  TBili  1.0  /  DBili  x   /  AST  15  /  ALT  24  /  AlkPhos  65  05-29      Urinalysis Basic - ( 29 May 2024 14:41 )    Color: x / Appearance: x / SG: x / pH: x  Gluc: 109 mg/dL / Ketone: x  / Bili: x / Urobili: x   Blood: x / Protein: x / Nitrite: x   Leuk Esterase: x / RBC: x / WBC x   Sq Epi: x / Non Sq Epi: x / Bacteria: x    IMAGING/ADDITIONAL STUDIES:     ACC: [id number] EXAM:  CT NECK SOFT TISSUE IC   ORDERED BY: YRN MUÑOZ     PROCEDURE DATE:  05/29/2024          INTERPRETATION:  Clinical history: Abscess.    Technique:  Multidetector axial CT images of the neck were obtained   following the intravenous administration of 100 ml of nonionic contrast.   Images were reformatted on multiple planes and reviewed in bone and   soft-tissue windows.    Comparison: Neck CT with contrast dated 4/15/2023.    Findings:  Aerodigestive structures: There is inflammation of the left palatine   tonsil with small foci of rim enhancing hypodensity which could reflect   early microabscesses. No drainable fluid collection. There is associated   submucosal edema in the left tonsillar wall partially effacing the left   vallecula.    Thyroid gland: Unremarkable.  Parotid and submandibular glands:  Unremarkable.    Lymph nodes: There are enhancing enlarged lymph nodes in bilateral level   2 and 3 stations measure up to 1.7 cm and the left and 1 cm in the right,   likely reactive in etiology. There is stable 1 cm left level 5 lymph node   in the left.    Vascular structures: Unremarkable.    Osseous structures: There is subacute/chronic appearing nondisplaced   fracture in the left anterior tubercle of C6, new since the prior neck CT   (4-159). No dislocation or destructive lesion.    Paranasal sinuses: There is mild mucosal thickening in scattered portions   of the visualized paranasal sinuses.  Mastoid air cells:  Clear.    Partially visualized intracranial structures: Unremarkable.  Orbits: Unremarkable.    Partially visualized lung apices: Unremarkable.    Miscellaneous: Unremarkable.      Impression:    Inflamed left palatine tonsil with small foci of rim enhancements which   could reflect tonsillitis with early microabscesses. No drainable fluid   collection. Of note, the patient had tonsillar abscesses on the prior   neck CT from 4/15/2023.  Recommend follow-up to resolution to exclude   underlying mass    Nonspecific bilateral level 2 and 3 cervical lymphadenopathy, likely   infectious/reactive. Stable mildly enlarged left level 5 lymph node.    Subacute/chronic appearing nondisplaced fracture in the left anterior   tubercle of C6, new since the prior neck CT from 4/15/2023.    ---End of Report ---    DENISE PFEIFFER MD; Attending Radiologist  This document has been electronically signed. May 29 2024  3:41PM

## 2024-05-29 NOTE — ED PROVIDER NOTE - CLINICAL SUMMARY MEDICAL DECISION MAKING FREE TEXT BOX
59-year-old male with PMHx as noted, in ER for evaluation of throat pain, difficulty swallowing, decreased p.o. intake, subjective fever.  Initially tachycardic, which resolved after IVF.  Labs reviewed: CBC/CMP unremarkable, lactate 0.9.  CT soft tissue neck: Inflamed L. Terell tonsil with small foci of room enhancement which could reflect tonsillitis with early micro abscesses, no drainable fluid collection; nonspecific bilateral cervical lymphadenopathy; subacute/chronic appearing nondisplaced fracture in the L anterior tubercle C6 (pt denies any pain to this area, no recent falls, non-tender on exam).  Patient given IVF, IV antibiotics, admitted to medicine for continued care. 59-year-old male with PMHx as noted, in ER for evaluation of throat pain, difficulty swallowing, decreased p.o. intake, subjective fever.  Initially tachycardic, which resolved after IVF.  Labs reviewed: CBC/CMP unremarkable, lactate 0.9.  CT soft tissue neck: Inflamed L. Terell tonsil with small foci of room enhancement which could reflect tonsillitis with early micro abscesses, no drainable fluid collection; nonspecific bilateral cervical lymphadenopathy; subacute/chronic appearing nondisplaced fracture in the L anterior tubercle C6 (pt denies any pain to this area, no recent falls, non-tender on exam).  Patient given IVF, IV antibiotics, steroids, admitted to medicine for continued care.

## 2024-05-29 NOTE — ED PROVIDER NOTE - CARE PLAN
1 Principal Discharge DX:	Pharyngitis  Secondary Diagnosis:	Closed C6 fracture   Principal Discharge DX:	Tonsillitis  Secondary Diagnosis:	Poor fluid intake

## 2024-05-29 NOTE — H&P ADULT - NSICDXPASTMEDICALHX_GEN_ALL_CORE_FT
PAST MEDICAL HISTORY:  History of alcohol use disorder     HTN (hypertension)     MRSA infection

## 2024-05-29 NOTE — ED PROVIDER NOTE - PROGRESS NOTE DETAILS
Spoke with neurosurgery states patient, soft collar as needed follow-up outpatient.  Patient states no acute falls within the last several months.  No symptoms currently.  No C-spine tenderness neck full range of motion ENT aware patient will follow-up.  No recommendations at this time

## 2024-05-29 NOTE — ED ADULT TRIAGE NOTE - CHIEF COMPLAINT QUOTE
Pt has hx of MRSA on his back and his throat. Pt states that he thinks that it is in his throat again and has pain x 2 days.

## 2024-05-29 NOTE — ED PROVIDER NOTE - OBJECTIVE STATEMENT
60 yo male, pmh of etoh abuse, prior mrsa throat infections, p/w throat pain and fever several days, mild, aching, no radiation. denies cp, sob, abd pain, nvd.

## 2024-05-29 NOTE — CONSULT NOTE ADULT - ASSESSMENT
Pt is a 58 year old Male with a pmh of etoh abuse, prior mrsa throat infections, a/w throat pain and fever several days; found to have an inflamed left palatine tonsil with small foci of rim enhancements which could reflect tonsillitis with early microabscesses. No drainable fluid collection on CT Neck soft tissue. Denies pain at rest, and reports tolerating po without issues.     Plan:  - No acute ENT/surgical intervention at this time  - Continue IV hydration   - Continue pain control prn  - Continue soft diet; advance as tolerated  - Continue steroids, decadron 6mg q 8h - if patient tolerating diet no need for steroids  - Obtain throat culture  - Continue abx   - Once patient adequately tolerating PO, can switch to PO abx and d/c home if stable  - Will discuss with attending

## 2024-05-29 NOTE — H&P ADULT - NSHPPHYSICALEXAM_GEN_ALL_CORE
CONSTITUTIONAL: Well groomed, no apparent distress  EYES: PERRLA and symmetric, EOMI, No conjunctival or scleral injection, non-icteric  ENMT: Oral mucosa with moist membranes. Normal dentition; no pharyngeal injection or exudates             NECK: Supple, symmetric and without tracheal deviation   RESP: No respiratory distress, no use of accessory muscles; CTA b/l, no WRR  CV: RRR, +S1S2, no MRG; no JVD; no peripheral edema  GI: Soft, NT, ND, no rebound, no guarding; no palpable masses; no hepatosplenomegaly; no hernia palpated  LYMPH: No cervical LAD or tenderness; no axillary LAD or tenderness; no inguinal LAD or tenderness  MSK: Normal gait; No digital clubbing or cyanosis; examination of the (head/neck/spine/ribs/pelvis, RUE, LUE, RLE, LLE) without misalignment,            Normal ROM without pain, no spinal tenderness, normal muscle strength/tone  SKIN: No rashes or ulcers noted; no subcutaneous nodules or induration palpable  NEURO: CN II-XII intact; normal reflexes in upper and lower extremities, sensation intact in upper and lower extremities b/l to light touch   PSYCH: Appropriate insight/judgment; A+O x 3, mood and affect appropriate, recent/remote memory intact CONSTITUTIONAL: Well groomed, no apparent distress  EYES: PERRLA and symmetric, EOMI, No conjunctival or scleral injection, non-icteric  ENMT: Oral cavity no erythema/edema. Uvula midline,  +erythema to left palatine tonsillar arch.   RESP: No respiratory distress, no use of accessory muscles; CTA b/l, no WRR  CV: RRR, +S1S2, no MRG; no JVD; no peripheral edema  GI: Soft, NT, ND, no rebound, no guarding; no palpable masses; no hepatosplenomegaly; no hernia palpated  LYMPH: No cervical LAD or tenderness  MSK: No digital clubbing or cyanosis  SKIN: No rashes or ulcers noted; no subcutaneous nodules or induration palpable  NEURO: CN II-XII intact; normal reflexes in upper and lower extremities, sensation intact in upper and lower extremities b/l to light touch   PSYCH: Appropriate insight/judgment; A+O x 3, mood and affect appropriate, recent/remote memory intact

## 2024-05-29 NOTE — H&P ADULT - HISTORY OF PRESENT ILLNESS
A 59Y/M with Pmhx of alcohol use disorder (on vivitrol injections), ex-smoker (~quit 12 months ago), left peritonsillar abscess(4/2023), left upper back skin abscess 2/2 MRSA presented to the ED for the evaluation of  throat pain, odynophagia(leading to decreased PO intake) and fever a/w malaise for 2 days, Also endorses muffled voice in the beginning of symptoms onset which has improved now. The oral intake has also been more comfortable on my eval. Patient had admission in April 2023 for left PTA, L intratonsillar abscess, & uvulitis s/p FNA with aspiration of purulent material on 4/16/23. Denied sob, abd pain, N/V/D, cough, dysuria, sick contacts, recent travel, headache, dizziness or LOC.    #ED Vitals:  T(C): 37.6 (05-29-24 @ 16:31), Max: 37.6 (05-29-24 @ 16:31)  HR: 94 (05-29-24 @ 16:31) (94 - 126)  BP: 104/65 (05-29-24 @ 16:31) (104/65 - 135/82)  RR: 18 (05-29-24 @ 16:31) (18 - 18)  SpO2: 95% (05-29-24 @ 16:31) (94% - 95%)    #Labs: WNL    #Imaging:  CT Neck Soft Tissue w/ IV Cont (05.29.24 @ 14:59):   Inflamed left palatine tonsil with small foci of rim enhancements which could reflect tonsillitis with early microabscesses. No drainable fluid collection. Nonspecific bilateral level 2 and 3 cervical lymphadenopathy, likely infectious/reactive. Stable mildly enlarged left level 5 lymph node.  Subacute/chronic appearing nondisplaced fracture in the left anterior tubercle of C6, new since the prior neck CT from 4/15/2023.    #S/P: LR bolus 2L, Clindamycin 900mg IV, Dexamethasone 10mg IV, Ketorolac 30mg IV in the ED    The patient is admitted to medicine for the further management.        A 59Y/M with Pmhx of alcohol use disorder (on vivitrol injections), ex-smoker (~quit 12 months ago, but relapsed again 3-4 cig per day), left peritonsillar abscess(4/2023), left upper back skin abscess 2/2 MRSA presented to the ED for the evaluation of  throat pain, odynophagia(leading to decreased PO intake) and fever a/w malaise for 2 days, Also endorses muffled voice in the beginning of symptoms onset which has improved now. The oral intake has also been more comfortable on my eval. Patient had admission in April 2023 for left PTA, L intratonsillar abscess, & uvulitis s/p FNA with aspiration of purulent material on 4/16/23. Denied sob, abd pain, N/V/D, cough, dysuria, sick contacts, recent travel, headache, dizziness or LOC.    #ED Vitals:  T(C): 37.6 (05-29-24 @ 16:31), Max: 37.6 (05-29-24 @ 16:31)  HR: 94 (05-29-24 @ 16:31) (94 - 126)  BP: 104/65 (05-29-24 @ 16:31) (104/65 - 135/82)  RR: 18 (05-29-24 @ 16:31) (18 - 18)  SpO2: 95% (05-29-24 @ 16:31) (94% - 95%)    #Labs: WNL    #Imaging:  CT Neck Soft Tissue w/ IV Cont (05.29.24 @ 14:59):   Inflamed left palatine tonsil with small foci of rim enhancements which could reflect tonsillitis with early microabscesses. No drainable fluid collection. Nonspecific bilateral level 2 and 3 cervical lymphadenopathy, likely infectious/reactive. Stable mildly enlarged left level 5 lymph node.  Subacute/chronic appearing nondisplaced fracture in the left anterior tubercle of C6, new since the prior neck CT from 4/15/2023.    #S/P: LR bolus 2L, Clindamycin 900mg IV, Dexamethasone 10mg IV, Ketorolac 30mg IV in the ED    The patient is admitted to medicine for the further management.

## 2024-05-29 NOTE — H&P ADULT - NSHPLABSRESULTS_GEN_ALL_CORE
LABS:                         14.9   9.55  )-----------( 159      ( 29 May 2024 14:41 )             43.7     05-29    135  |  99  |  13  ----------------------------<  109<H>  4.6   |  28  |  1.3    Ca    9.9      29 May 2024 14:41    TPro  7.1  /  Alb  4.9  /  TBili  1.0  /  DBili  x   /  AST  15  /  ALT  24  /  AlkPhos  65  05-29      Urinalysis Basic - ( 29 May 2024 14:41 )    Color: x / Appearance: x / SG: x / pH: x  Gluc: 109 mg/dL / Ketone: x  / Bili: x / Urobili: x   Blood: x / Protein: x / Nitrite: x   Leuk Esterase: x / RBC: x / WBC x   Sq Epi: x / Non Sq Epi: x / Bacteria: x            Lactate, Blood: 0.9 mmol/L (05-29 @ 16:55)      RADIOLOGY, EKG & ADDITIONAL TESTS: Reviewed.

## 2024-05-29 NOTE — ED PROVIDER NOTE - PHYSICAL EXAMINATION
Physical Exam    Vital Signs: I have reviewed the initial vital signs.  Constitutional: appears stated age, no acute distress  Eyes: Conjunctiva pink, Sclera clear, PERRLA, EOMI.  ENT: OP erythematous with exudates, uvula midline, tolerating own secretions, no trismus or tripoding, uvula midline, normal voice  Cardiovascular: S1 and S2, regular rate, regular rhythm, well-perfused extremities, radial pulses equal and 2+, pedal pulses 2+ and equal  Respiratory: unlabored respiratory effort, clear to auscultation bilaterally no wheezing, rales and rhonchi  Gastrointestinal: soft, non-tender abdomen, no pulsatile mass, normal bowl sounds  Musculoskeletal: supple neck, no lower extremity edema, no midline tenderness  Integumentary: warm, dry, no rash  Neurologic: awake, alert nvi

## 2024-05-29 NOTE — H&P ADULT - ATTENDING COMMENTS
HPI:  A 59Y/M with Pmhx of alcohol use disorder (on vivitrol injections), ex-smoker (~quit 12 months ago, but relapsed again 3-4 cig per day), left peritonsillar abscess(4/2023), left upper back skin abscess 2/2 MRSA presented to the ED for the evaluation of  throat pain, odynophagia(leading to decreased PO intake) and fever a/w malaise for 2 days, Also endorses muffled voice in the beginning of symptoms onset which has improved now. The oral intake has also been more comfortable on my eval. Patient had admission in April 2023 for left PTA, L intratonsillar abscess, & uvulitis s/p FNA with aspiration of purulent material on 4/16/23. Denied sob, abd pain, N/V/D, cough, dysuria, sick contacts, recent travel, headache, dizziness or LOC.    #ED Vitals:  T(C): 37.6 (05-29-24 @ 16:31), Max: 37.6 (05-29-24 @ 16:31)  HR: 94 (05-29-24 @ 16:31) (94 - 126)  BP: 104/65 (05-29-24 @ 16:31) (104/65 - 135/82)  RR: 18 (05-29-24 @ 16:31) (18 - 18)  SpO2: 95% (05-29-24 @ 16:31) (94% - 95%)    #Labs: WNL    #Imaging:  CT Neck Soft Tissue w/ IV Cont (05.29.24 @ 14:59):   Inflamed left palatine tonsil with small foci of rim enhancements which could reflect tonsillitis with early microabscesses. No drainable fluid collection. Nonspecific bilateral level 2 and 3 cervical lymphadenopathy, likely infectious/reactive. Stable mildly enlarged left level 5 lymph node.  Subacute/chronic appearing nondisplaced fracture in the left anterior tubercle of C6, new since the prior neck CT from 4/15/2023.    #S/P: LR bolus 2L, Clindamycin 900mg IV, Dexamethasone 10mg IV, Ketorolac 30mg IV in the ED    The patient is admitted to medicine for the further management.    (29 May 2024 18:46)    REVIEW OF SYSTEMS: see cc/HPI   CONSTITUTIONAL: No weakness, fevers or chills  EYES/ENT: No visual changes;  No vertigo or throat pain   NECK: No pain or stiffness  RESPIRATORY: No cough, wheezing, hemoptysis; No shortness of breath  CARDIOVASCULAR: No chest pain or palpitations  GASTROINTESTINAL: No abdominal or epigastric pain. No nausea, vomiting, or hematemesis; No diarrhea or constipation. No melena or hematochezia.  GENITOURINARY: No dysuria, frequency or hematuria  NEUROLOGICAL: No numbness or weakness  SKIN: No itching, rashes  ENDO: No hyperglycemia, No thyroid disorder, No dyslipidemia   HEM: No bleeding, No easy bruising, No anemia   PSYCHE: No psychosis, No mood disorder No hallucinations No delusion   MSK: No deformity, No fracture, No Joint swelling    Physical Exam:   General: WN/WD NAD  Neurology: A&Ox3, nonfocal, follows commands  Eyes: PERRLA/ EOMI  ENT/Neck: Neck supple, trachea midline, No JVD  Respiratory: CTA B/L, No wheezing, rales, rhonchi  CV: Normal rate regular rhythm, S1S2, no murmurs, rubs or gallops  Abdominal: Soft, NT, ND +BS,   Extremities: No edema, + peripheral pulses  Skin: No Rashes, Hematoma, Ecchymosis  Incisions:   Tubes: HPI:  A 59Y/M with Pmhx of alcohol use disorder (on vivitrol injections), ex-smoker (~quit 12 months ago, but relapsed again 3-4 cig per day), left peritonsillar abscess(4/2023), left upper back skin abscess 2/2 MRSA presented to the ED for the evaluation of  throat pain, odynophagia(leading to decreased PO intake) and fever a/w malaise for 2 days, Also endorses muffled voice in the beginning of symptoms onset which has improved now. The oral intake has also been more comfortable on my eval. Patient had admission in April 2023 for left PTA, L intratonsillar abscess, & uvulitis s/p FNA with aspiration of purulent material on 4/16/23. Denied sob, abd pain, N/V/D, cough, dysuria, sick contacts, recent travel, headache, dizziness or LOC.    #ED Vitals:  T(C): 37.6 (05-29-24 @ 16:31), Max: 37.6 (05-29-24 @ 16:31)  HR: 94 (05-29-24 @ 16:31) (94 - 126)  BP: 104/65 (05-29-24 @ 16:31) (104/65 - 135/82)  RR: 18 (05-29-24 @ 16:31) (18 - 18)  SpO2: 95% (05-29-24 @ 16:31) (94% - 95%)    #Labs: WNL    #Imaging:  CT Neck Soft Tissue w/ IV Cont (05.29.24 @ 14:59):   Inflamed left palatine tonsil with small foci of rim enhancements which could reflect tonsillitis with early microabscesses. No drainable fluid collection. Nonspecific bilateral level 2 and 3 cervical lymphadenopathy, likely infectious/reactive. Stable mildly enlarged left level 5 lymph node.  Subacute/chronic appearing nondisplaced fracture in the left anterior tubercle of C6, new since the prior neck CT from 4/15/2023.    #S/P: LR bolus 2L, Clindamycin 900mg IV, Dexamethasone 10mg IV, Ketorolac 30mg IV in the ED    The patient is admitted to medicine for the further management.    (29 May 2024 18:46)    REVIEW OF SYSTEMS: see cc/HPI   CONSTITUTIONAL: No weakness, fevers or chills  EYES/ENT: No visual changes;  No vertigo or throat pain   NECK: No pain or stiffness  RESPIRATORY: No cough, wheezing, hemoptysis; No shortness of breath  CARDIOVASCULAR: No chest pain or palpitations  GASTROINTESTINAL: No abdominal or epigastric pain. No nausea, vomiting, or hematemesis; No diarrhea or constipation. No melena or hematochezia.  GENITOURINARY: No dysuria, frequency or hematuria  NEUROLOGICAL: No numbness or weakness  SKIN: No itching, rashes  ENDO: No hyperglycemia, No thyroid disorder, No dyslipidemia   HEM: No bleeding, No easy bruising, No anemia   PSYCHE: No psychosis, No mood disorder No hallucinations No delusion   MSK: No deformity, No fracture, No Joint swelling    Physical Exam:   General: WN/WD NAD  Neurology: A&Ox3, nonfocal, follows commands  Eyes: PERRLA/ EOMI  ENT/Neck:(+)  Neck tender to palp, (+) swollen tonsils abutting the uvula,  trachea midline, No JVD  Respiratory: CTA B/L, No wheezing, rales, rhonchi  CV: Normal rate regular rhythm, S1S2, no murmurs, rubs or gallops  Abdominal: Soft, NT, ND +BS,   Extremities: No edema, + peripheral pulses  Skin: No Rashes, Hematoma, Ecchymosis    A/p  Tonsilitis / microabscess - no drainable collection   -IV abx   -IV Decadron   -check blood Cx and ASLO   -PRN pain and fever control   -advance diet as tolerated     Incidental subacute / chronic non-displaced C-6 tubercle fracture   Suspected cervical radiculopathy - tingling in the neck   -OP MRI and PRN Neuro/Neurosurgical eval     DVT prophylaxis    PATIENT SEEN by ATTENDING 5/29 HPI:  A 59Y/M with Pmhx of alcohol use disorder (on vivitrol injections), ex-smoker (~quit 12 months ago, but relapsed again 3-4 cig per day), left peritonsillar abscess(4/2023), left upper back skin abscess 2/2 MRSA presented to the ED for the evaluation of  throat pain, odynophagia(leading to decreased PO intake) and fever a/w malaise for 2 days, Also endorses muffled voice in the beginning of symptoms onset which has improved now. The oral intake has also been more comfortable on my eval. Patient had admission in April 2023 for left PTA, L intratonsillar abscess, & uvulitis s/p FNA with aspiration of purulent material on 4/16/23. Denied sob, abd pain, N/V/D, cough, dysuria, sick contacts, recent travel, headache, dizziness or LOC.    #ED Vitals:  T(C): 37.6 (05-29-24 @ 16:31), Max: 37.6 (05-29-24 @ 16:31)  HR: 94 (05-29-24 @ 16:31) (94 - 126)  BP: 104/65 (05-29-24 @ 16:31) (104/65 - 135/82)  RR: 18 (05-29-24 @ 16:31) (18 - 18)  SpO2: 95% (05-29-24 @ 16:31) (94% - 95%)    #Labs: WNL    #Imaging:  CT Neck Soft Tissue w/ IV Cont (05.29.24 @ 14:59):   Inflamed left palatine tonsil with small foci of rim enhancements which could reflect tonsillitis with early microabscesses. No drainable fluid collection. Nonspecific bilateral level 2 and 3 cervical lymphadenopathy, likely infectious/reactive. Stable mildly enlarged left level 5 lymph node.  Subacute/chronic appearing nondisplaced fracture in the left anterior tubercle of C6, new since the prior neck CT from 4/15/2023.    #S/P: LR bolus 2L, Clindamycin 900mg IV, Dexamethasone 10mg IV, Ketorolac 30mg IV in the ED    The patient is admitted to medicine for the further management.    (29 May 2024 18:46)    REVIEW OF SYSTEMS: see cc/HPI   CONSTITUTIONAL: No weakness, fevers or chills  EYES/ENT: No visual changes;  No vertigo or throat pain   NECK: No pain or stiffness  RESPIRATORY: No cough, wheezing, hemoptysis; No shortness of breath  CARDIOVASCULAR: No chest pain or palpitations  GASTROINTESTINAL: No abdominal or epigastric pain. No nausea, vomiting, or hematemesis; No diarrhea or constipation. No melena or hematochezia.  GENITOURINARY: No dysuria, frequency or hematuria  NEUROLOGICAL: No numbness or weakness  SKIN: No itching, rashes  ENDO: No hyperglycemia, No thyroid disorder, No dyslipidemia   HEM: No bleeding, No easy bruising, No anemia   PSYCHE: No psychosis, No mood disorder No hallucinations No delusion   MSK: No deformity, No fracture, No Joint swelling    Physical Exam:   General: WN/WD NAD  Neurology: A&Ox3, nonfocal, follows commands  Eyes: PERRLA/ EOMI  ENT/Neck:(+)  Neck tender to palp, (+) swollen tonsils abutting the uvula,  trachea midline, No JVD  Respiratory: CTA B/L, No wheezing, rales, rhonchi  CV: Normal rate regular rhythm, S1S2, no murmurs, rubs or gallops  Abdominal: Soft, NT, ND +BS,   Extremities: No edema, + peripheral pulses  Skin: No Rashes, Hematoma, Ecchymosis    A/p  Tonsilitis / microabscess - no drainable collection   -IV abx   -IV Decadron   -check blood Cx/throat culture and ASO titer   -PRN pain and fever control   -advance diet as tolerated     Incidental subacute / chronic non-displaced C-6 tubercle fracture   Suspected cervical radiculopathy - tingling in the neck   -OP MRI and PRN Neuro/Neurosurgical eval     DVT prophylaxis    PATIENT SEEN by ATTENDING 5/29

## 2024-05-30 LAB
ANION GAP SERPL CALC-SCNC: 10 MMOL/L — SIGNIFICANT CHANGE UP (ref 7–14)
BUN SERPL-MCNC: 20 MG/DL — SIGNIFICANT CHANGE UP (ref 10–20)
CALCIUM SERPL-MCNC: 9.5 MG/DL — SIGNIFICANT CHANGE UP (ref 8.4–10.5)
CHLORIDE SERPL-SCNC: 103 MMOL/L — SIGNIFICANT CHANGE UP (ref 98–110)
CO2 SERPL-SCNC: 26 MMOL/L — SIGNIFICANT CHANGE UP (ref 17–32)
CREAT SERPL-MCNC: 1.2 MG/DL — SIGNIFICANT CHANGE UP (ref 0.7–1.5)
EGFR: 70 ML/MIN/1.73M2 — SIGNIFICANT CHANGE UP
GLUCOSE SERPL-MCNC: 179 MG/DL — HIGH (ref 70–99)
HCT VFR BLD CALC: 40.5 % — LOW (ref 42–52)
HGB BLD-MCNC: 13.8 G/DL — LOW (ref 14–18)
MCHC RBC-ENTMCNC: 29.1 PG — SIGNIFICANT CHANGE UP (ref 27–31)
MCHC RBC-ENTMCNC: 34.1 G/DL — SIGNIFICANT CHANGE UP (ref 32–37)
MCV RBC AUTO: 85.4 FL — SIGNIFICANT CHANGE UP (ref 80–94)
MRSA PCR RESULT.: NEGATIVE — SIGNIFICANT CHANGE UP
NRBC # BLD: 0 /100 WBCS — SIGNIFICANT CHANGE UP (ref 0–0)
PLATELET # BLD AUTO: 164 K/UL — SIGNIFICANT CHANGE UP (ref 130–400)
PMV BLD: 11 FL — HIGH (ref 7.4–10.4)
POTASSIUM SERPL-MCNC: 4.4 MMOL/L — SIGNIFICANT CHANGE UP (ref 3.5–5)
POTASSIUM SERPL-SCNC: 4.4 MMOL/L — SIGNIFICANT CHANGE UP (ref 3.5–5)
RBC # BLD: 4.74 M/UL — SIGNIFICANT CHANGE UP (ref 4.7–6.1)
RBC # FLD: 12 % — SIGNIFICANT CHANGE UP (ref 11.5–14.5)
SODIUM SERPL-SCNC: 139 MMOL/L — SIGNIFICANT CHANGE UP (ref 135–146)
WBC # BLD: 9.46 K/UL — SIGNIFICANT CHANGE UP (ref 4.8–10.8)
WBC # FLD AUTO: 9.46 K/UL — SIGNIFICANT CHANGE UP (ref 4.8–10.8)

## 2024-05-30 PROCEDURE — 99232 SBSQ HOSP IP/OBS MODERATE 35: CPT

## 2024-05-30 PROCEDURE — 99223 1ST HOSP IP/OBS HIGH 75: CPT

## 2024-05-30 RX ADMIN — AMPICILLIN SODIUM AND SULBACTAM SODIUM 200 GRAM(S): 250; 125 INJECTION, POWDER, FOR SUSPENSION INTRAMUSCULAR; INTRAVENOUS at 23:08

## 2024-05-30 RX ADMIN — PANTOPRAZOLE SODIUM 40 MILLIGRAM(S): 20 TABLET, DELAYED RELEASE ORAL at 09:03

## 2024-05-30 RX ADMIN — AMPICILLIN SODIUM AND SULBACTAM SODIUM 200 GRAM(S): 250; 125 INJECTION, POWDER, FOR SUSPENSION INTRAMUSCULAR; INTRAVENOUS at 17:24

## 2024-05-30 RX ADMIN — AMPICILLIN SODIUM AND SULBACTAM SODIUM 200 GRAM(S): 250; 125 INJECTION, POWDER, FOR SUSPENSION INTRAMUSCULAR; INTRAVENOUS at 05:20

## 2024-05-30 RX ADMIN — Medication 6 MILLIGRAM(S): at 21:26

## 2024-05-30 RX ADMIN — Medication 6 MILLIGRAM(S): at 14:14

## 2024-05-30 RX ADMIN — AMPICILLIN SODIUM AND SULBACTAM SODIUM 200 GRAM(S): 250; 125 INJECTION, POWDER, FOR SUSPENSION INTRAMUSCULAR; INTRAVENOUS at 11:57

## 2024-05-30 RX ADMIN — Medication 6 MILLIGRAM(S): at 05:42

## 2024-05-31 ENCOUNTER — TRANSCRIPTION ENCOUNTER (OUTPATIENT)
Age: 59
End: 2024-05-31

## 2024-05-31 VITALS
HEART RATE: 68 BPM | RESPIRATION RATE: 18 BRPM | SYSTOLIC BLOOD PRESSURE: 100 MMHG | TEMPERATURE: 98 F | DIASTOLIC BLOOD PRESSURE: 58 MMHG | OXYGEN SATURATION: 95 %

## 2024-05-31 PROCEDURE — 99238 HOSP IP/OBS DSCHRG MGMT 30/<: CPT

## 2024-05-31 RX ADMIN — PANTOPRAZOLE SODIUM 40 MILLIGRAM(S): 20 TABLET, DELAYED RELEASE ORAL at 05:41

## 2024-05-31 RX ADMIN — Medication 6 MILLIGRAM(S): at 05:33

## 2024-05-31 RX ADMIN — AMPICILLIN SODIUM AND SULBACTAM SODIUM 200 GRAM(S): 250; 125 INJECTION, POWDER, FOR SUSPENSION INTRAMUSCULAR; INTRAVENOUS at 05:33

## 2024-05-31 NOTE — PROGRESS NOTE ADULT - SUBJECTIVE AND OBJECTIVE BOX
AISHA AMBAR  59y Male    INTERVAL HPI/OVERNIGHT EVENTS:    pt seen on rounds in ED4 this morning  Ate breakfast without difficulty  feels much better today  no fever, pain, odynophagia  clear speech    T(F): 98 (05-30-24 @ 20:40), Max: 98 (05-30-24 @ 20:40)  HR: 91 (05-30-24 @ 20:40) (77 - 99)  BP: 129/73 (05-30-24 @ 20:40) (103/65 - 129/73)  RR: 18 (05-30-24 @ 20:40) (18 - 18)  SpO2: 96% (05-30-24 @ 20:40) (95% - 96%) on RA    PHYSICAL EXAM:  GENERAL: NAD  HEAD:  Normocephalic  EYES:  conjunctiva and sclera clear  ENMT: Moist mucous membranes, unable to appreciate full view of pharynx (did not have tongue depressor), uvula midline  NERVOUS SYSTEM:  Alert, awake, Good concentration  CHEST/LUNG: CTA b/l  HEART: Regular rate and rhythm  ABDOMEN: Soft, Nontender, Nondistended  EXTREMITIES:   No edema LE  SKIN: warm, dry    Consultant(s) Notes Reviewed:  [x ] YES  [ ] NO  Care Discussed with Consultants/Other Providers [ x] YES  [ ] NO    MEDICATIONS  (STANDING):  ampicillin/sulbactam  IVPB 3 Gram(s) IV Intermittent every 6 hours  dexAMETHasone  Injectable 6 milliGRAM(s) IV Push every 8 hours  enoxaparin Injectable 40 milliGRAM(s) SubCutaneous every 24 hours  pantoprazole    Tablet 40 milliGRAM(s) Oral before breakfast    MEDICATIONS  (PRN):  acetaminophen     Tablet .. 650 milliGRAM(s) Oral every 6 hours PRN Temp greater or equal to 38C (100.4F), Mild Pain (1 - 3)  aluminum hydroxide/magnesium hydroxide/simethicone Suspension 30 milliLiter(s) Oral every 4 hours PRN Dyspepsia  melatonin 3 milliGRAM(s) Oral at bedtime PRN Insomnia  ondansetron Injectable 4 milliGRAM(s) IV Push every 8 hours PRN Nausea and/or Vomiting      LABS:                        13.8   9.46  )-----------( 164      ( 30 May 2024 06:32 )             40.5     05-30    139  |  103  |  20  ----------------------------<  179<H>  4.4   |  26  |  1.2    Ca    9.5      30 May 2024 06:32    TPro  7.1  /  Alb  4.9  /  TBili  1.0  /  DBili  x   /  AST  15  /  ALT  24  /  AlkPhos  65  05-29        Lactate, Blood: 0.9 mmol/L (05-29 @ 16:55)          RADIOLOGY & ADDITIONAL TESTS:    Imaging or report Personally Reviewed:  [ x] YES  [ ] NO    < from: CT Neck Soft Tissue w/ IV Cont (05.29.24 @ 14:59) >  Impression:    Inflamed left palatine tonsil with small foci of rim enhancements which   could reflect tonsillitis with early microabscesses. No drainable fluid   collection. Of note, the patient had tonsillar abscesses on the prior   neck CT from 4/15/2023.  Recommend follow-up to resolution to exclude   underlying mass    Nonspecific bilateral level 2 and 3 cervical lymphadenopathy, likely   infectious/reactive. Stable mildly enlarged left level 5 lymph node.    Subacute/chronic appearing nondisplaced fracture in the left anterior   tubercle of C6, new since the prior neck CT from 4/15/2023.    < end of copied text >              Case discussed with residents and RN on rounds today    Care discussed with pt        
  AMBAR LEONARD  59y Male    INTERVAL HPI/OVERNIGHT EVENTS:    pt feels well - no complaints  tolerating diet  no fever  comfortable in going home todya    T(F): 97.9 (05-31-24 @ 07:29), Max: 98 (05-30-24 @ 20:40)  HR: 68 (05-31-24 @ 07:29) (68 - 91)  BP: 100/58 (05-31-24 @ 07:29) (100/58 - 129/73)  RR: 18 (05-31-24 @ 07:29) (18 - 18)  SpO2: 95% (05-31-24 @ 07:29) (95% - 96%)    PHYSICAL EXAM:  GENERAL: NAD  HEAD:  Normocephalic  EYES:  conjunctiva and sclera clear  ENMT: Moist mucous membranes, uvula midline  NERVOUS SYSTEM:  Alert, awake, Good concentration    Consultant(s) Notes Reviewed:  [x ] YES  [ ] NO  Care Discussed with Consultants/Other Providers [ x] YES  [ ] NO    MEDICATIONS  (STANDING):  ampicillin/sulbactam  IVPB 3 Gram(s) IV Intermittent every 6 hours  dexAMETHasone  Injectable 6 milliGRAM(s) IV Push every 8 hours  enoxaparin Injectable 40 milliGRAM(s) SubCutaneous every 24 hours  pantoprazole    Tablet 40 milliGRAM(s) Oral before breakfast    MEDICATIONS  (PRN):  acetaminophen     Tablet .. 650 milliGRAM(s) Oral every 6 hours PRN Temp greater or equal to 38C (100.4F), Mild Pain (1 - 3)  aluminum hydroxide/magnesium hydroxide/simethicone Suspension 30 milliLiter(s) Oral every 4 hours PRN Dyspepsia  melatonin 3 milliGRAM(s) Oral at bedtime PRN Insomnia  ondansetron Injectable 4 milliGRAM(s) IV Push every 8 hours PRN Nausea and/or Vomiting      LABS:                        13.8   9.46  )-----------( 164      ( 30 May 2024 06:32 )             40.5     05-30    139  |  103  |  20  ----------------------------<  179<H>  4.4   |  26  |  1.2    Ca    9.5      30 May 2024 06:32    TPro  7.1  /  Alb  4.9  /  TBili  1.0  /  DBili  x   /  AST  15  /  ALT  24  /  AlkPhos  65  05-29        Culture - Blood (collected 29 May 2024 16:55)  Source: .Blood Blood-Peripheral  Preliminary Report (30 May 2024 23:02):    No growth at 24 hours    Culture - Blood (collected 29 May 2024 16:55)  Source: .Blood Blood-Peripheral  Preliminary Report (30 May 2024 23:02):    No growth at 24 hours      Lactate, Blood: 0.9 mmol/L (05-29 @ 16:55)      Case discussed with residents and RN on rounds today    Care discussed with pt

## 2024-05-31 NOTE — DISCHARGE NOTE PROVIDER - HOSPITAL COURSE
A 59Y/M with Pmhx of alcohol use disorder (on vivitrol injections), ex-smoker (~quit 12 months ago, but relapsed again 3-4 cig per day), left peritonsillar abscess(4/2023), left upper back skin abscess 2/2 MRSA presented to the ED for the evaluation of  throat pain, odynophagia(leading to decreased PO intake) and fever a/w malaise for 2 days, Also endorses muffled voice in the beginning of symptoms onset which has improved now. The oral intake has also been more comfortable on my eval. Patient had admission in April 2023 for left PTA, L intratonsillar abscess, & uvulitis s/p FNA with aspiration of purulent material on 4/16/23. Denied sob, abd pain, N/V/D, cough, dysuria, sick contacts, recent travel, headache, dizziness or LOC.    #ED Vitals:  T(C): 37.6 (05-29-24 @ 16:31), Max: 37.6 (05-29-24 @ 16:31)  HR: 94 (05-29-24 @ 16:31) (94 - 126)  BP: 104/65 (05-29-24 @ 16:31) (104/65 - 135/82)  RR: 18 (05-29-24 @ 16:31) (18 - 18)  SpO2: 95% (05-29-24 @ 16:31) (94% - 95%)    #Labs: WNL    #Imaging:  CT Neck Soft Tissue w/ IV Cont (05.29.24 @ 14:59):   Inflamed left palatine tonsil with small foci of rim enhancements which could reflect tonsillitis with early microabscesses. No drainable fluid collection. Nonspecific bilateral level 2 and 3 cervical lymphadenopathy, likely infectious/reactive. Stable mildly enlarged left level 5 lymph node.  Subacute/chronic appearing nondisplaced fracture in the left anterior tubercle of C6, new since the prior neck CT from 4/15/2023.    #S/P: LR bolus 2L, Clindamycin 900mg IV, Dexamethasone 10mg IV, Ketorolac 30mg IV in the ED    The patient is admitted to medicine for the further management.     ENT consulted for patient and no acute surgical intervention at this time. Continued with IV decadron and pain control. Obtained throat cx and blood cx. Blood culture NGTD. Will transition to PO ABX upon discharge.

## 2024-05-31 NOTE — DISCHARGE NOTE PROVIDER - NSDCCPCAREPLAN_GEN_ALL_CORE_FT
PRINCIPAL DISCHARGE DIAGNOSIS  Diagnosis: Tonsillitis  Assessment and Plan of Treatment: you came in for throat pain and found to have inflammation of your tonsils with microabscesses in the left palatine tonsil on CT scan. You were started on IV antibiotics for the infection and transitioned to oral antibiotics since you are tolerating food, water, and pills. Please continue to take the antibiotics as directed and follow up with your primary care doctor as well.      SECONDARY DISCHARGE DIAGNOSES  Diagnosis: Poor fluid intake  Assessment and Plan of Treatment:      PRINCIPAL DISCHARGE DIAGNOSIS  Diagnosis: Tonsillitis  Assessment and Plan of Treatment: you came in for throat pain and found to have inflammation of your tonsils with microabscesses in the left palatine tonsil on CT scan. You were started on IV antibiotics for the infection and transitioned to oral antibiotics since you are tolerating food, water, and pills. Please continue to take the antibiotics as directed and follow up with your primary care doctor as well.  Please follow up with ENT clinic for further evaluation and maangement of the recurrence of this condition. Please get a repeat CT scan of the neck, soft tissue in 3 months.      SECONDARY DISCHARGE DIAGNOSES  Diagnosis: Fracture of cervical vertebra  Assessment and Plan of Treatment: Found to have a Subacute/chronic appearing nondisplaced fracture in the left anterior   tubercle of C6, new since the prior neck CT from 4/15/2023  Please follow-up outpatient with Neurosurgery within a month for further management.

## 2024-05-31 NOTE — DISCHARGE NOTE PROVIDER - CARE PROVIDERS DIRECT ADDRESSES
,[email or web address]

## 2024-05-31 NOTE — DISCHARGE NOTE PROVIDER - PROVIDER TOKENS
PROVIDER:[TOKEN:[[id number]],FOLLOWUP:[2 weeks]] PROVIDER:[TOKEN:[[id number]],FOLLOWUP:[2 weeks]],PROVIDER:[TOKEN:[[id number]],FOLLOWUP:[1 month]],PROVIDER:[TOKEN:[[id number]],FOLLOWUP:[1 month]]

## 2024-05-31 NOTE — DISCHARGE NOTE NURSING/CASE MANAGEMENT/SOCIAL WORK - PATIENT PORTAL LINK FT
You can access the FollowMyHealth Patient Portal offered by [facility] by registering at the following website: [email or web address]. By joining [employer]’s FollowMyHealth portal, you will also be able to view your health information using other applications (apps) compatible with our system.

## 2024-05-31 NOTE — DISCHARGE NOTE PROVIDER - DISCHARGE DATE
Rounding completed. Pt resting comfortably. Call light within reach. No additional needs at this time.      31-May-2024

## 2024-05-31 NOTE — DISCHARGE NOTE PROVIDER - NSDCMRMEDTOKEN_GEN_ALL_CORE_FT
Vivitrol 380 mg intramuscular injection, extended release: 380 milligram(s) intramuscularly once a month   amoxicillin-clavulanate 875 mg-125 mg oral tablet: 875 milligram(s) orally 2 times a day  Vivitrol 380 mg intramuscular injection, extended release: 380 milligram(s) intramuscularly once a month

## 2024-05-31 NOTE — DISCHARGE NOTE PROVIDER - CARE PROVIDER_API CALL
Amanuel Hyatt  Internal Medicine  [address] [place] [address]  Phone: [phone]  Fax: [phone]  Follow Up Time: 2 weeks   Amanuel Hyatt  Internal Medicine  [address] [place] [address]  Phone: [phone]  Fax: [phone]  Follow Up Time: 2 weeks    Delmer Siegel  Otolaryngology  [address] [place] [id number]  Phone: [phone]  Fax: [phone]  Follow Up Time: 1 month    Nancy Washington  Neurosurgery  [address], [address]  [place] [address]  Phone: [phone]  Fax: [phone]  Follow Up Time: 1 month

## 2024-05-31 NOTE — PROGRESS NOTE ADULT - ASSESSMENT
58 y/o man with PMH of alcohol use disorder (on vivitrol injections), ex-smoker (~quit 12 months ago, but relapsed again 3-4 cig per day), left peritonsillar abscess, left intratonsillar abscess and uvulitis s/p FNA in April 2023 and left upper back skin abscess due to MRSA in 12/23 presented to the ED for the evaluation of throat pain, odynophagia (leading to decreased PO intake), fever and malaise for 2 days. He stated that he had muffled voice at the beginning of symptom onset which has now improved now.     1. Left Tonsilitis with microabscesses - no drainable collection   CT scan of neck: Inflamed left palatine tonsil with small foci of rim enhancements which could reflect tonsillitis with early microabscesses. No drainable fluid collection. Of note, the patient had tonsillar abscesses on the prior   neck CT from 4/15/2023.  Recommend follow-up to resolution to exclude underlying mass. Nonspecific bilateral level 2 and 3 cervical lymphadenopathy, likely infectious/reactive. Stable mildly enlarged left level 5 lymph node.  ENT consult appreciated - continue IV abx and IV decadron  blood cultures negative  throat culture pending  pt is clinically improved  Augmentin on discharge x 10 days  can stop steroids now per ENT since he is improved  he needs f/u with ENT and repeat CT scan after infection is resolved to rule out underlying mass (+smoker and alcohol use) - discussed with pt today  on soft diet    2. Incidental subacute / chronic non-displaced C-6 tubercle fracture   new compared to CT scan in April 2023  outpt MRI and Neurosurgical eval     3. h/o MRSA infection in December 2023  MRSA nares negative      full code      med rec and discharge papers reviewed by me
58 y/o man with PMH of alcohol use disorder (on vivitrol injections), ex-smoker (~quit 12 months ago, but relapsed again 3-4 cig per day), left peritonsillar abscess, left intratonsillar abscess and uvulitis s/p FNA in April 2023 and left upper back skin abscess due to MRSA in 12/23 presented to the ED for the evaluation of throat pain, odynophagia (leading to decreased PO intake), fever and malaise for 2 days. He stated that he had muffled voice at the beginning of symptom onset which has now improved now.     1. Left Tonsilitis with microabscesses - no drainable collection   CT scan of neck: Inflamed left palatine tonsil with small foci of rim enhancements which could reflect tonsillitis with early microabscesses. No drainable fluid collection. Of note, the patient had tonsillar abscesses on the prior   neck CT from 4/15/2023.  Recommend follow-up to resolution to exclude underlying mass. Nonspecific bilateral level 2 and 3 cervical lymphadenopathy, likely infectious/reactive. Stable mildly enlarged left level 5 lymph node.  ENT consult appreciated - continue IV abx and IV decadron  f/u blood cultures and throat culture  pt is clinically improved  continue Unasyn  on decadron  anticipate discharge in 24hrs on oral abx if he continues to improve and after culture results are reviewed  he needs f/u with ENT and repeat CT scan after infection is resolved to rule out underlying mass (+smoker and alcohol use)  on soft diet    2. Incidental subacute / chronic non-displaced C-6 tubercle fracture   new compared to CT scan in April 2023  outpt MRI and Neurosurgical eval     3. h/o MRSA infection in December 2023  MRSA nares negative    4. DVT prophylaxis  OOB and ambulate      full code      PROGRESS NOTE HANDOFF    Pending: f/u blood and throat cultures  pt informed of the plan of care  Disposition: home in 24hrs on oral abx if continues to improve

## 2024-06-02 LAB
CULTURE RESULTS: ABNORMAL
SPECIMEN SOURCE: SIGNIFICANT CHANGE UP

## 2024-06-06 DIAGNOSIS — Z86.19 PERSONAL HISTORY OF OTHER INFECTIOUS AND PARASITIC DISEASES: ICD-10-CM

## 2024-06-06 DIAGNOSIS — J03.90 ACUTE TONSILLITIS, UNSPECIFIED: ICD-10-CM

## 2024-06-06 DIAGNOSIS — R13.10 DYSPHAGIA, UNSPECIFIED: ICD-10-CM
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
## 2024-06-06 DIAGNOSIS — R65.10 SYSTEMIC INFLAMMATORY RESPONSE SYNDROME (SIRS) OF NON-INFECTIOUS ORIGIN WITHOUT ACUTE ORGAN DYSFUNCTION: ICD-10-CM

## 2024-06-06 DIAGNOSIS — Z79.899 OTHER LONG TERM (CURRENT) DRUG THERAPY: ICD-10-CM

## 2024-06-06 DIAGNOSIS — R63.8 OTHER SYMPTOMS AND SIGNS CONCERNING FOOD AND FLUID INTAKE: ICD-10-CM
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
## 2024-06-06 DIAGNOSIS — I10 ESSENTIAL (PRIMARY) HYPERTENSION: ICD-10-CM

## 2024-06-06 DIAGNOSIS — F17.210 NICOTINE DEPENDENCE, CIGARETTES, UNCOMPLICATED: ICD-10-CM

## 2024-06-06 DIAGNOSIS — Z87.898 PERSONAL HISTORY OF OTHER SPECIFIED CONDITIONS: ICD-10-CM
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
## 2024-06-06 DIAGNOSIS — S12.501A UNSPECIFIED NONDISPLACED FRACTURE OF SIXTH CERVICAL VERTEBRA, INITIAL ENCOUNTER FOR CLOSED FRACTURE: ICD-10-CM

## 2024-06-06 DIAGNOSIS — J36 PERITONSILLAR ABSCESS: ICD-10-CM

## 2024-07-31 PROBLEM — Z87.898 PERSONAL HISTORY OF OTHER SPECIFIED CONDITIONS: Chronic | Status: ACTIVE | Noted: 2024-05-29

## 2024-07-31 PROBLEM — I10 ESSENTIAL (PRIMARY) HYPERTENSION: Chronic | Status: ACTIVE | Noted: 2024-05-29

## 2024-07-31 PROBLEM — A49.02 METHICILLIN RESISTANT STAPHYLOCOCCUS AUREUS INFECTION, UNSPECIFIED SITE: Chronic | Status: ACTIVE | Noted: 2024-05-29

## 2024-08-12 ENCOUNTER — APPOINTMENT (OUTPATIENT)
Dept: OTOLARYNGOLOGY | Facility: CLINIC | Age: 59
End: 2024-08-12
Payer: MEDICARE

## 2024-08-12 ENCOUNTER — RX RENEWAL (OUTPATIENT)
Age: 59
End: 2024-08-12

## 2024-08-12 ENCOUNTER — APPOINTMENT (OUTPATIENT)
Dept: OTOLARYNGOLOGY | Facility: CLINIC | Age: 59
End: 2024-08-12

## 2024-08-12 DIAGNOSIS — H91.90 UNSPECIFIED HEARING LOSS, UNSPECIFIED EAR: ICD-10-CM

## 2024-08-12 DIAGNOSIS — H91.91 UNSPECIFIED HEARING LOSS, RIGHT EAR: ICD-10-CM

## 2024-08-12 DIAGNOSIS — R07.0 PAIN IN THROAT: ICD-10-CM

## 2024-08-12 DIAGNOSIS — J03.91 ACUTE RECURRENT TONSILLITIS, UNSPECIFIED: ICD-10-CM

## 2024-08-12 PROCEDURE — 31575 DIAGNOSTIC LARYNGOSCOPY: CPT

## 2024-08-12 PROCEDURE — 99213 OFFICE O/P EST LOW 20 MIN: CPT | Mod: 25

## 2024-08-12 PROCEDURE — 92550 TYMPANOMETRY & REFLEX THRESH: CPT | Mod: 52

## 2024-08-12 PROCEDURE — 92557 COMPREHENSIVE HEARING TEST: CPT

## 2024-08-12 RX ORDER — PANTOPRAZOLE 40 MG/1
40 TABLET, DELAYED RELEASE ORAL
Qty: 90 | Refills: 0 | Status: ACTIVE | COMMUNITY
Start: 2024-08-12 | End: 1900-01-01

## 2024-08-12 RX ORDER — FAMOTIDINE 40 MG/1
40 TABLET, FILM COATED ORAL
Qty: 90 | Refills: 0 | Status: ACTIVE | COMMUNITY
Start: 2024-08-12 | End: 1900-01-01

## 2024-08-12 NOTE — HISTORY OF PRESENT ILLNESS
[de-identified] : Patient presents today c/o nasal obstruction.  He noticed that one nostril keeps getting blocked.   History of tonsillitis , seen in ED 5/31/24 with notes reviewed. Pt has had several infections over the past few years  Right ear numbness and tingling.  Ears feel clogged. No recent audiogram performed.  Pt has history of smoking and drinking. Currently smoking.

## 2024-08-12 NOTE — PROCEDURE
[None] : none [Normal] : posterior cricoid area had healthy pink mucosa in the interarytenoid area and the esophageal inlet [de-identified] : throat disconfort

## 2024-08-21 ENCOUNTER — NON-APPOINTMENT (OUTPATIENT)
Age: 59
End: 2024-08-21

## 2024-08-22 ENCOUNTER — APPOINTMENT (OUTPATIENT)
Dept: NEUROSURGERY | Facility: CLINIC | Age: 59
End: 2024-08-22
Payer: MEDICARE

## 2024-08-22 VITALS — BODY MASS INDEX: 24.25 KG/M2 | HEIGHT: 68 IN | WEIGHT: 160 LBS

## 2024-08-22 DIAGNOSIS — F17.200 NICOTINE DEPENDENCE, UNSPECIFIED, UNCOMPLICATED: ICD-10-CM

## 2024-08-22 DIAGNOSIS — M54.2 CERVICALGIA: ICD-10-CM

## 2024-08-22 DIAGNOSIS — Z80.8 FAMILY HISTORY OF MALIGNANT NEOPLASM OF OTHER ORGANS OR SYSTEMS: ICD-10-CM

## 2024-08-22 DIAGNOSIS — E04.2 NONTOXIC MULTINODULAR GOITER: ICD-10-CM

## 2024-08-22 PROCEDURE — 99203 OFFICE O/P NEW LOW 30 MIN: CPT

## 2024-08-22 NOTE — HISTORY OF PRESENT ILLNESS
[de-identified] : This is a 58 yo M who presents for neurosurgical consultation with regards to right sided neck pain. As a review, he had presented to [facility] in May of 2024 with regards to tonsillitis. Due to recurrent (MRSA) peritonsillar abscesses he underwent CT Neck which revealed an incidental finding of acute/subacute left anterior tubercle fx of C6. Patient without left sided neck pain; he also denies any radicular features correlating with the C6 nerve root. Since the time of diagnosis he has attempted chiropractic efforts without relief. He has not undergone Pain Management evaluation nor Physical Therapy. He cannot recall prior cervical trauma which could have led to this fx although he notes recurrent falls/incidences due to alcoholism.  IMAGING: [facility] films-  CT C spine- 5/2024- evidence of tonsilitis; incidental subacute/chronic left anterior tubercle fx of C6.  PHYSICAL EXAM:   Constitutional: Well appearing, no distress HEENT: Normocephalic Atraumatic Psychiatric: Alert and oriented to all spheres, normal mood Pulmonary: No respiratory distress  Neurologic:  CN II-XII grossly intact Palpation: (+) R cervical paravertebral tenderness to palpation Strength: Full strength in all major muscle groups Sensation: Full sensation to light touch in all extremities Reflexes:   2+ biceps  2+ triceps   No Dover's  No clonus  ROM intact  Gait: steady, walking w/o assistance.

## 2024-08-22 NOTE — HISTORY OF PRESENT ILLNESS
[de-identified] : This is a 60 yo M who presents for neurosurgical consultation with regards to right sided neck pain. As a review, he had presented to [facility] in May of 2024 with regards to tonsillitis. Due to recurrent (MRSA) peritonsillar abscesses he underwent CT Neck which revealed an incidental finding of acute/subacute left anterior tubercle fx of C6. Patient without left sided neck pain; he also denies any radicular features correlating with the C6 nerve root. Since the time of diagnosis he has attempted chiropractic efforts without relief. He has not undergone Pain Management evaluation nor Physical Therapy. He cannot recall prior cervical trauma which could have led to this fx although he notes recurrent falls/incidences due to alcoholism.  IMAGING: [facility] films-  CT C spine- 5/2024- evidence of tonsilitis; incidental subacute/chronic left anterior tubercle fx of C6.  PHYSICAL EXAM:   Constitutional: Well appearing, no distress HEENT: Normocephalic Atraumatic Psychiatric: Alert and oriented to all spheres, normal mood Pulmonary: No respiratory distress  Neurologic:  CN II-XII grossly intact Palpation: (+) R cervical paravertebral tenderness to palpation Strength: Full strength in all major muscle groups Sensation: Full sensation to light touch in all extremities Reflexes:   2+ biceps  2+ triceps   No Dover's  No clonus  ROM intact  Gait: steady, walking w/o assistance.

## 2024-08-22 NOTE — ASSESSMENT
[FreeTextEntry1] : This is a 59-year-old male who presents for neurosurgical consultation with regards to incidental finding of left anterior tubercle fracture at C6.  Patient without any correlating symptoms at this time.  I have outlined that this is likely an incidental finding.  He is to trial a course of physical therapy 2-3 times per week in conjunction with a home PT program to hopefully remedy his right sided cervicalgia.  If such efforts were to fail, he can return to the office for additional evaluation and treatment at which time we would likely order a MR C-spine.  All questions and concerns have been answered at this time. He will return to the office as needed moving forward and can contact me any concerns in the interim.  Sherice Dodd PA-C

## 2024-10-14 NOTE — DISCHARGE NOTE NURSING/CASE MANAGEMENT/SOCIAL WORK - NSTRANSFERBELONGINGSDISPO_GEN_A_NUR
Orders:    Flu vaccine, trivalent, preservative free, HIGH-DOSE, age 65y+ (Fluzone)     not applicable/with patient Abnormal VS & WBC Abnormal VS & WBC/Abnormal Lactate

## 2024-10-21 ENCOUNTER — EMERGENCY (EMERGENCY)
Facility: HOSPITAL | Age: 59
LOS: 0 days | Discharge: HOME | End: 2024-10-22
Attending: EMERGENCY MEDICINE
Payer: MEDICARE

## 2024-10-21 VITALS
SYSTOLIC BLOOD PRESSURE: 136 MMHG | TEMPERATURE: 98 F | WEIGHT: 160.06 LBS | HEART RATE: 103 BPM | RESPIRATION RATE: 20 BRPM | DIASTOLIC BLOOD PRESSURE: 90 MMHG | OXYGEN SATURATION: 99 %

## 2024-10-21 DIAGNOSIS — R05.9 COUGH, UNSPECIFIED: ICD-10-CM

## 2024-10-21 DIAGNOSIS — R11.2 NAUSEA WITH VOMITING, UNSPECIFIED: ICD-10-CM

## 2024-10-21 DIAGNOSIS — R07.89 OTHER CHEST PAIN: ICD-10-CM

## 2024-10-21 DIAGNOSIS — R06.02 SHORTNESS OF BREATH: ICD-10-CM

## 2024-10-21 DIAGNOSIS — R06.2 WHEEZING: ICD-10-CM

## 2024-10-21 LAB
BASOPHILS # BLD AUTO: 0.06 K/UL — SIGNIFICANT CHANGE UP (ref 0–0.2)
BASOPHILS NFR BLD AUTO: 1 % — SIGNIFICANT CHANGE UP (ref 0–1)
BILIRUB SERPL-MCNC: 0.3 MG/DL — SIGNIFICANT CHANGE UP (ref 0.2–1.2)
EOSINOPHIL # BLD AUTO: 0.27 K/UL — SIGNIFICANT CHANGE UP (ref 0–0.7)
EOSINOPHIL NFR BLD AUTO: 4.5 % — SIGNIFICANT CHANGE UP (ref 0–8)
HCT VFR BLD CALC: 41.2 % — LOW (ref 42–52)
HGB BLD-MCNC: 14.3 G/DL — SIGNIFICANT CHANGE UP (ref 14–18)
IMM GRANULOCYTES NFR BLD AUTO: 0.3 % — SIGNIFICANT CHANGE UP (ref 0.1–0.3)
LYMPHOCYTES # BLD AUTO: 3.06 K/UL — SIGNIFICANT CHANGE UP (ref 1.2–3.4)
LYMPHOCYTES # BLD AUTO: 50.6 % — SIGNIFICANT CHANGE UP (ref 20.5–51.1)
MCHC RBC-ENTMCNC: 29.5 PG — SIGNIFICANT CHANGE UP (ref 27–31)
MCHC RBC-ENTMCNC: 34.7 G/DL — SIGNIFICANT CHANGE UP (ref 32–37)
MCV RBC AUTO: 85.1 FL — SIGNIFICANT CHANGE UP (ref 80–94)
MONOCYTES # BLD AUTO: 0.48 K/UL — SIGNIFICANT CHANGE UP (ref 0.1–0.6)
MONOCYTES NFR BLD AUTO: 7.9 % — SIGNIFICANT CHANGE UP (ref 1.7–9.3)
NEUTROPHILS # BLD AUTO: 2.16 K/UL — SIGNIFICANT CHANGE UP (ref 1.4–6.5)
NEUTROPHILS NFR BLD AUTO: 35.7 % — LOW (ref 42.2–75.2)
NRBC # BLD: 0 /100 WBCS — SIGNIFICANT CHANGE UP (ref 0–0)
PLATELET # BLD AUTO: 185 K/UL — SIGNIFICANT CHANGE UP (ref 130–400)
PMV BLD: 10.3 FL — SIGNIFICANT CHANGE UP (ref 7.4–10.4)
RBC # BLD: 4.84 M/UL — SIGNIFICANT CHANGE UP (ref 4.7–6.1)
RBC # FLD: 13.1 % — SIGNIFICANT CHANGE UP (ref 11.5–14.5)
WBC # BLD: 6.05 K/UL — SIGNIFICANT CHANGE UP (ref 4.8–10.8)
WBC # FLD AUTO: 6.05 K/UL — SIGNIFICANT CHANGE UP (ref 4.8–10.8)

## 2024-10-21 PROCEDURE — 36415 COLL VENOUS BLD VENIPUNCTURE: CPT

## 2024-10-21 PROCEDURE — 85025 COMPLETE CBC W/AUTO DIFF WBC: CPT

## 2024-10-21 PROCEDURE — 99285 EMERGENCY DEPT VISIT HI MDM: CPT | Mod: 25

## 2024-10-21 PROCEDURE — 96375 TX/PRO/DX INJ NEW DRUG ADDON: CPT

## 2024-10-21 PROCEDURE — 84484 ASSAY OF TROPONIN QUANT: CPT

## 2024-10-21 PROCEDURE — 96374 THER/PROPH/DIAG INJ IV PUSH: CPT

## 2024-10-21 PROCEDURE — 83735 ASSAY OF MAGNESIUM: CPT

## 2024-10-21 PROCEDURE — 71045 X-RAY EXAM CHEST 1 VIEW: CPT

## 2024-10-21 PROCEDURE — 99285 EMERGENCY DEPT VISIT HI MDM: CPT

## 2024-10-21 PROCEDURE — 93010 ELECTROCARDIOGRAM REPORT: CPT | Mod: 76

## 2024-10-21 PROCEDURE — 0241U: CPT

## 2024-10-21 PROCEDURE — 93005 ELECTROCARDIOGRAM TRACING: CPT

## 2024-10-21 PROCEDURE — 83690 ASSAY OF LIPASE: CPT

## 2024-10-21 PROCEDURE — 80053 COMPREHEN METABOLIC PANEL: CPT

## 2024-10-21 RX ORDER — CHLORDIAZEPOXIDE HCL 10 MG
50 CAPSULE ORAL ONCE
Refills: 0 | Status: DISCONTINUED | OUTPATIENT
Start: 2024-10-21 | End: 2024-10-21

## 2024-10-21 RX ORDER — ONDANSETRON HCL/PF 4 MG/2 ML
4 VIAL (ML) INJECTION ONCE
Refills: 0 | Status: COMPLETED | OUTPATIENT
Start: 2024-10-21 | End: 2024-10-21

## 2024-10-21 RX ADMIN — Medication 50 MILLIGRAM(S): at 22:39

## 2024-10-21 NOTE — ED PROVIDER NOTE - OBJECTIVE STATEMENT
59-year-old male with past medical history of EtOH use disorder, ex-smoker presents with complaints of cough x 3 days associated with shortness of breath, chest tightness, wheezing.  Reports over the past week he has been drinking more and states he has been noncompliant with his Vivitrol.  States he has been having approximately a bottle of liquor daily over the weekend.  States his last drink was 3 hours prior to arrival in emergency department.  Also reports multiple episodes NBNB emesis over the past week.  Denies fever/chills, dyspnea on exertion, abdominal pain, diarrhea, dysuria/frequency/urgency/hematuria, melena/hematochezia, lightheadedness, dizziness, focal numbness/weakness, rash, SI/HI.

## 2024-10-21 NOTE — ED PROVIDER NOTE - CLINICAL SUMMARY MEDICAL DECISION MAKING FREE TEXT BOX
59-year-old male with past medical history of EtOH use disorder, ex-smoker presents with complaints of cough x 3 days associated with shortness of breath, chest tightness, wheezing.  Reports over the past week he has been drinking more and states he has been noncompliant with his Vivitrol.  States he has been having approximately a bottle of liquor daily over the weekend.  States his last drink was 3 hours prior to arrival in emergency department.  Also reports multiple episodes NBNB emesis over the past week.  Denies fever/chills, dyspnea on exertion, abdominal pain, diarrhea, dysuria/frequency/urgency/hematuria, melena/hematochezia, lightheadedness, dizziness, focal numbness/weakness, rash, SI/HI.    Pt had labs, ekg, xray done. Results all discussed with the patient. 2 troponin neg. Magnesium low and replaced in ER. PT felt better after meds. CIWA 1. Pt refusing etoh detox admission (wife wanted it). PT However prefers to do this as outpt and already has a center/group he will be doing this thru.To dc and return precautions provided.

## 2024-10-21 NOTE — ED PROVIDER NOTE - NSFOLLOWUPINSTRUCTIONS_ED_ALL_ED_FT
Nausea / Vomiting    Nausea is the feeling that you have to vomit. As nausea gets worse, it can lead to vomiting. Vomiting puts you at an increased risk for dehydration. Older adults and people with other diseases or a weak immune system are at higher risk for dehydration. Drink clear fluids in small but frequent amounts as tolerated. Eat bland, easy-to-digest foods in small amounts as tolerated.    SEEK IMMEDIATE MEDICAL CARE IF YOU HAVE ANY OF THE FOLLOWING SYMPTOMS: fever, inability to keep sufficient fluids down, black or bloody vomitus, black or bloody stools, lightheadedness/dizziness, chest pain, severe headache, rash, shortness of breath, cold or clammy skin, confusion, pain with urination, or any signs of dehydration.    Cough    Coughing is a reflex that clears your throat and your airways. Coughing helps to heal and protect your lungs. It is normal to cough occasionally, but a cough that happens with other symptoms or lasts a long time may be a sign of a condition that needs treatment. Coughing may be caused by infections, asthma or COPD, smoking, postnasal drip, gastroesophageal reflux, as well as other medical conditions. Take medicines only as instructed by your health care provider. Avoid environments or triggers that causes you to cough at work or at home.    SEEK IMMEDIATE MEDICAL CARE IF YOU HAVE ANY OF THE FOLLOWING SYMPTOMS: coughing up blood, shortness of breath, rapid heart rate, chest pain, unexplained weight loss or night sweats.    Please follow up with your primary care doctor within one week.

## 2024-10-21 NOTE — ED ADULT NURSE NOTE - OBJECTIVE STATEMENT
pt c/o SOB that started this afternoon. denies Chest pain. pt  states she has hx of asthma. RR even and unlabored, no distress noted.

## 2024-10-21 NOTE — ED PROVIDER NOTE - PATIENT PORTAL LINK FT
You can access the FollowMyHealth Patient Portal offered by [facility] by registering at the following website: [email or web address]. By joining [employer]’s FollowMyHealth portal, you will also be able to view your health information using other applications (apps) compatible with our system.

## 2024-10-22 VITALS
OXYGEN SATURATION: 97 % | DIASTOLIC BLOOD PRESSURE: 72 MMHG | TEMPERATURE: 98 F | HEART RATE: 86 BPM | SYSTOLIC BLOOD PRESSURE: 122 MMHG | RESPIRATION RATE: 20 BRPM

## 2024-10-22 LAB
ALBUMIN SERPL ELPH-MCNC: 4.4 G/DL — SIGNIFICANT CHANGE UP (ref 3.5–5.2)
ALP SERPL-CCNC: 78 U/L — SIGNIFICANT CHANGE UP (ref 30–115)
ALT FLD-CCNC: 37 U/L — SIGNIFICANT CHANGE UP (ref 0–41)
ANION GAP SERPL CALC-SCNC: 14 MMOL/L — SIGNIFICANT CHANGE UP (ref 7–14)
AST SERPL-CCNC: 23 U/L — SIGNIFICANT CHANGE UP (ref 0–41)
BUN SERPL-MCNC: 17 MG/DL — SIGNIFICANT CHANGE UP (ref 10–20)
CALCIUM SERPL-MCNC: 9.2 MG/DL — SIGNIFICANT CHANGE UP (ref 8.4–10.5)
CHLORIDE SERPL-SCNC: 102 MMOL/L — SIGNIFICANT CHANGE UP (ref 98–110)
CO2 SERPL-SCNC: 23 MMOL/L — SIGNIFICANT CHANGE UP (ref 17–32)
CREAT SERPL-MCNC: 1 MG/DL — SIGNIFICANT CHANGE UP (ref 0.7–1.5)
EGFR: 87 ML/MIN/1.73M2 — SIGNIFICANT CHANGE UP
FLUAV AG NPH QL: SIGNIFICANT CHANGE UP
FLUBV AG NPH QL: SIGNIFICANT CHANGE UP
GLUCOSE SERPL-MCNC: 102 MG/DL — HIGH (ref 70–99)
MAGNESIUM SERPL-MCNC: 1.7 MG/DL — LOW (ref 1.8–2.4)
POTASSIUM SERPL-MCNC: 4.2 MMOL/L — SIGNIFICANT CHANGE UP (ref 3.5–5)
POTASSIUM SERPL-SCNC: 4.2 MMOL/L — SIGNIFICANT CHANGE UP (ref 3.5–5)
PROT SERPL-MCNC: 7 G/DL — SIGNIFICANT CHANGE UP (ref 6–8)
RSV RNA NPH QL NAA+NON-PROBE: SIGNIFICANT CHANGE UP
SARS-COV-2 RNA SPEC QL NAA+PROBE: SIGNIFICANT CHANGE UP
SODIUM SERPL-SCNC: 139 MMOL/L — SIGNIFICANT CHANGE UP (ref 135–146)
TROPONIN T, HIGH SENSITIVITY RESULT: <6 NG/L — SIGNIFICANT CHANGE UP (ref 6–21)
TROPONIN T, HIGH SENSITIVITY RESULT: <6 NG/L — SIGNIFICANT CHANGE UP (ref 6–21)

## 2024-10-22 PROCEDURE — 71045 X-RAY EXAM CHEST 1 VIEW: CPT | Mod: 26

## 2024-10-22 RX ORDER — FAMOTIDINE 40 MG
1 TABLET ORAL
Qty: 28 | Refills: 0
Start: 2024-10-22 | End: 2024-11-04

## 2024-10-22 RX ORDER — MAGNESIUM SULFATE 500 MG/ML
2 VIAL (ML) INJECTION ONCE
Refills: 0 | Status: COMPLETED | OUTPATIENT
Start: 2024-10-22 | End: 2024-10-22

## 2024-10-22 RX ORDER — FAMOTIDINE 40 MG
20 TABLET ORAL ONCE
Refills: 0 | Status: COMPLETED | OUTPATIENT
Start: 2024-10-22 | End: 2024-10-22

## 2024-10-22 RX ADMIN — Medication 25 GRAM(S): at 02:29

## 2024-10-22 RX ADMIN — Medication 20 MILLIGRAM(S): at 01:45

## 2025-06-24 NOTE — ED PROVIDER NOTE - WR ORDER DATE AND TIME 1
21-Oct-2024 22:22 wound care performed, Allevyn dressing on bony areas, turn and position q 2 hrs, moisture barrier/unstageable

## 2025-08-20 ENCOUNTER — EMERGENCY (EMERGENCY)
Facility: HOSPITAL | Age: 60
LOS: 0 days | Discharge: HOME | End: 2025-08-20
Attending: STUDENT IN AN ORGANIZED HEALTH CARE EDUCATION/TRAINING PROGRAM
Payer: MEDICARE

## 2025-08-20 VITALS
HEART RATE: 86 BPM | RESPIRATION RATE: 18 BRPM | TEMPERATURE: 98 F | OXYGEN SATURATION: 100 % | SYSTOLIC BLOOD PRESSURE: 135 MMHG | DIASTOLIC BLOOD PRESSURE: 97 MMHG | HEIGHT: 68 IN | WEIGHT: 163.8 LBS

## 2025-08-20 DIAGNOSIS — R20.2 PARESTHESIA OF SKIN: ICD-10-CM

## 2025-08-20 DIAGNOSIS — M54.89 OTHER DORSALGIA: ICD-10-CM

## 2025-08-20 DIAGNOSIS — M25.511 PAIN IN RIGHT SHOULDER: ICD-10-CM

## 2025-08-20 DIAGNOSIS — R53.1 WEAKNESS: ICD-10-CM

## 2025-08-20 DIAGNOSIS — M54.2 CERVICALGIA: ICD-10-CM

## 2025-08-20 PROCEDURE — 99284 EMERGENCY DEPT VISIT MOD MDM: CPT

## 2025-08-20 PROCEDURE — 99283 EMERGENCY DEPT VISIT LOW MDM: CPT

## 2025-08-20 RX ORDER — DEXAMETHASONE 0.5 MG/1
10 TABLET ORAL ONCE
Refills: 0 | Status: COMPLETED | OUTPATIENT
Start: 2025-08-20 | End: 2025-08-20

## 2025-08-20 RX ORDER — IBUPROFEN 200 MG
600 TABLET ORAL ONCE
Refills: 0 | Status: COMPLETED | OUTPATIENT
Start: 2025-08-20 | End: 2025-08-20

## 2025-08-20 RX ORDER — METHOCARBAMOL 500 MG/1
1500 TABLET, FILM COATED ORAL ONCE
Refills: 0 | Status: COMPLETED | OUTPATIENT
Start: 2025-08-20 | End: 2025-08-20

## 2025-08-20 RX ORDER — METHOCARBAMOL 500 MG/1
1 TABLET, FILM COATED ORAL
Qty: 10 | Refills: 0
Start: 2025-08-20 | End: 2025-08-24

## 2025-08-20 RX ORDER — LIDOCAINE HYDROCHLORIDE 20 MG/ML
1 JELLY TOPICAL
Qty: 2 | Refills: 0
Start: 2025-08-20 | End: 2025-08-29

## 2025-08-20 RX ORDER — IBUPROFEN 200 MG
1 TABLET ORAL
Qty: 20 | Refills: 0
Start: 2025-08-20 | End: 2025-08-24

## 2025-08-20 RX ADMIN — DEXAMETHASONE 10 MILLIGRAM(S): 0.5 TABLET ORAL at 15:59

## 2025-08-20 RX ADMIN — Medication 600 MILLIGRAM(S): at 14:46

## 2025-08-20 RX ADMIN — METHOCARBAMOL 1500 MILLIGRAM(S): 500 TABLET, FILM COATED ORAL at 14:46
